# Patient Record
Sex: FEMALE | Race: WHITE | NOT HISPANIC OR LATINO | Employment: OTHER | URBAN - METROPOLITAN AREA
[De-identification: names, ages, dates, MRNs, and addresses within clinical notes are randomized per-mention and may not be internally consistent; named-entity substitution may affect disease eponyms.]

---

## 2017-03-30 ENCOUNTER — GENERIC CONVERSION - ENCOUNTER (OUTPATIENT)
Dept: OTHER | Facility: OTHER | Age: 72
End: 2017-03-30

## 2017-04-09 DIAGNOSIS — Z12.31 ENCOUNTER FOR SCREENING MAMMOGRAM FOR MALIGNANT NEOPLASM OF BREAST: ICD-10-CM

## 2017-04-14 ENCOUNTER — HOSPITAL ENCOUNTER (OUTPATIENT)
Dept: RADIOLOGY | Age: 72
Discharge: HOME | End: 2017-04-14
Payer: MEDICARE

## 2017-04-14 DIAGNOSIS — Z12.31 ENCOUNTER FOR SCREENING MAMMOGRAM FOR MALIGNANT NEOPLASM OF BREAST: ICD-10-CM

## 2017-04-14 PROCEDURE — G0202 SCR MAMMO BI INCL CAD: HCPCS

## 2017-08-01 ENCOUNTER — ALLSCRIPTS OFFICE VISIT (OUTPATIENT)
Dept: OTHER | Facility: OTHER | Age: 72
End: 2017-08-01

## 2017-11-17 ENCOUNTER — ANESTHESIA EVENT (OUTPATIENT)
Dept: GASTROENTEROLOGY | Facility: HOSPITAL | Age: 72
End: 2017-11-17
Payer: MEDICARE

## 2017-11-17 ENCOUNTER — ANESTHESIA (OUTPATIENT)
Dept: GASTROENTEROLOGY | Facility: HOSPITAL | Age: 72
End: 2017-11-17
Payer: MEDICARE

## 2017-11-17 ENCOUNTER — HOSPITAL ENCOUNTER (OUTPATIENT)
Facility: HOSPITAL | Age: 72
Setting detail: OUTPATIENT SURGERY
Discharge: HOME | End: 2017-11-17
Attending: COLON & RECTAL SURGERY | Admitting: COLON & RECTAL SURGERY
Payer: MEDICARE

## 2017-11-17 VITALS
HEIGHT: 62 IN | RESPIRATION RATE: 16 BRPM | BODY MASS INDEX: 26.5 KG/M2 | HEART RATE: 78 BPM | OXYGEN SATURATION: 99 % | WEIGHT: 144 LBS | DIASTOLIC BLOOD PRESSURE: 65 MMHG | TEMPERATURE: 97.9 F | SYSTOLIC BLOOD PRESSURE: 113 MMHG

## 2017-11-17 RX ORDER — LISINOPRIL 20 MG/1
20 TABLET ORAL DAILY
COMMUNITY
End: 2021-07-29 | Stop reason: SDUPTHER

## 2017-11-17 RX ORDER — SODIUM CHLORIDE 9 MG/ML
50 INJECTION, SOLUTION INTRAVENOUS CONTINUOUS
Status: DISCONTINUED | OUTPATIENT
Start: 2017-11-17 | End: 2017-11-17 | Stop reason: HOSPADM

## 2017-11-17 RX ORDER — PROPOFOL 10 MG/ML
INJECTION, EMULSION INTRAVENOUS AS NEEDED
Status: DISCONTINUED | OUTPATIENT
Start: 2017-11-17 | End: 2017-11-17 | Stop reason: SURG

## 2017-11-17 RX ORDER — FOLIC ACID 1 MG/1
800 TABLET ORAL
COMMUNITY

## 2017-11-17 RX ORDER — AMLODIPINE BESYLATE 5 MG/1
5 TABLET ORAL DAILY
COMMUNITY
End: 2021-06-02 | Stop reason: SDUPTHER

## 2017-11-17 RX ORDER — LEVOTHYROXINE SODIUM 0.1 MG/1
88 TABLET ORAL DAILY
COMMUNITY
End: 2021-07-29 | Stop reason: SDUPTHER

## 2017-11-17 RX ORDER — PROPOFOL 10 MG/ML
INJECTION, EMULSION INTRAVENOUS CONTINUOUS PRN
Status: DISCONTINUED | OUTPATIENT
Start: 2017-11-17 | End: 2017-11-17 | Stop reason: SURG

## 2017-11-17 RX ORDER — DIPHENOXYLATE HYDROCHLORIDE AND ATROPINE SULFATE 2.5; .025 MG/1; MG/1
1 TABLET ORAL
COMMUNITY

## 2017-11-17 RX ORDER — LIDOCAINE HYDROCHLORIDE 10 MG/ML
INJECTION, SOLUTION INFILTRATION; PERINEURAL AS NEEDED
Status: DISCONTINUED | OUTPATIENT
Start: 2017-11-17 | End: 2017-11-17 | Stop reason: SURG

## 2017-11-17 RX ORDER — LOVASTATIN 20 MG/1
20 TABLET ORAL
COMMUNITY
End: 2021-10-11 | Stop reason: SDUPTHER

## 2017-11-17 RX ADMIN — SODIUM CHLORIDE 50 ML/HR: 0.9 INJECTION, SOLUTION INTRAVENOUS at 09:19

## 2017-11-17 RX ADMIN — PROPOFOL 80 MG: 10 INJECTION, EMULSION INTRAVENOUS at 09:34

## 2017-11-17 RX ADMIN — LIDOCAINE HYDROCHLORIDE 50 MG: 10 INJECTION, SOLUTION INFILTRATION; PERINEURAL at 09:34

## 2017-11-17 RX ADMIN — PROPOFOL 100 MCG/KG/MIN: 10 INJECTION, EMULSION INTRAVENOUS at 09:34

## 2017-11-17 NOTE — ANESTHESIA POSTPROCEDURE EVALUATION
Post-Op Assessment Note      CV Status:  Stable    Mental Status:  Somnolent    Hydration Status:  Euvolemic    PONV Controlled:  Controlled    Airway Patency:  Patent    Post Op Vitals Reviewed: Yes          Staff: CRNA           BP   93/50   Temp      Pulse  63   Resp 16   SpO2 97

## 2017-11-17 NOTE — OP NOTE
**** GI/ENDOSCOPY REPORT ****     PATIENT NAME: Chris Casas ------ VISIT ID:     INTRODUCTION: Colonoscopy - A 67 female patient presents for an outpatient   Colonoscopy at [address] [place] [address]  PREVIOUS COLONOSCOPY:     INDICATIONS: Rectal bleeding  H/o anal stricture  CONSENT:  The benefits, risks, and alternatives to the procedure were   discussed and informed consent was obtained from the patient  PREPARATION: The patient was identified by myself both verbally and by   visual inspection of ID band  EKG, pulse, pulse oximetry and blood   pressure were monitored throughout the procedure  ASA Classification:   Class 1 - Patient has no organic, physiologic, biochemical, or psychiatric   disturbance  Airway Assessment Classification: Airway class 1 -   Visualization of the soft palate, fauces, uvula, and posterior pillars  MEDICATIONS: Anesthesia-check records     RECTAL EXAM: Normal sphincter tone  Anal canal stritcure anoplasty surgery   well healed with no difficulty  No internal hemorrhoids  No external   hemorrhoids  PROCEDURE:  The endoscope was passed without difficulty through the anus   under direct visualization and advanced to the cecum, confirmed by   photographs  The quality of the preparation was  The scope was withdrawn   and the mucosa was carefully examined  The views were good  The patient's   toleration of the procedure was good  Cecal Intubation Time: 6 minutes(s)   Cecal Withdrawal Time: 7 minutes(s)     FINDINGS:  There was no evidence of erythematous mucosae, polyps, or   tumors in the colon  There was evidence of moderately severe   diverticulosis in the sigmoid colon  Otherwise, the colon appeared to be   normal  Appendiceal opening identified     COMPLICATIONS: There were no complications  IMPRESSIONS: No evidence of erythematous mucosae, polyps, and tumors in   the colon  Moderately severe diverticulosis found in the sigmoid colon  RECOMMENDATIONS: Continue current medications  Call if you are having any   problems: Dr Rosa Maria Lopez [phone]  Start high fiber diet  Colonoscopy   recommended in 5 years  Call if any signs or symptoms of abdominal pain,   bleeding or diverticulitis  PATHOLOGY SPECIMENS:     PROCEDURE CODES: Colonoscopy     ICD-9 Codes: 569 3 Hemorrhage of rectum and anus 562 10 Diverticulosis of   colon (without mention of hemorrhage)     ICD-10 Codes: K62 5 Hemorrhage of anus and rectum K57 Diverticular disease   of intestine     PERFORMED BY: JANET Stratton  on 11/17/2017  Version 1, electronically signed by JANET Stratton  on   11/17/2017 at 09:56

## 2017-11-17 NOTE — ANESTHESIA PREPROCEDURE EVALUATION
Review of Systems/Medical History  Patient summary reviewed  Chart reviewed  No history of anesthetic complications     Cardiovascular  Exercise tolerance: good,  Hypertension controlled, No CAD, , Cardiac stents     Pulmonary  Not a smoker , No asthma: , No shortness of breath, No recent URI , ,        GI/Hepatic            Endo/Other  History of thyroid disease , hypothyroidism,      GYN       Hematology   Musculoskeletal  Rheumatoid arthritis (denies neck pain or instability) Severity: mild,        Neurology   Psychology           Physical Exam    Airway    Mallampati score: II  TM Distance: >3 FB       Dental       Cardiovascular      Pulmonary  Breath sounds clear to auscultation,     Other Findings  NPO > 8 HRS      Anesthesia Plan  ASA Score- 2       Anesthesia Type- IV sedation with anesthesia with ASA Monitors  Additional Monitors:   Airway Plan:     Comment: JANET Briceño , have personally seen and evaluated the patient prior to anesthetic care  I have reviewed the pre-anesthetic record, and other medical records if appropriate to the anesthetic care  If a CRNA is involved in the case, I have reviewed the CRNA assessment, if present, and agree  Risks/benefits and alternatives discussed with patient including possible PONV, sore throat, and possibility of rare anesthetic and surgical emergencies          Induction-       Informed Consent- Anesthetic plan and risks discussed with patient  I personally reviewed this patient with the CRNA  Discussed and agreed on the Anesthesia Plan with the CRNA  Mason Vinson

## 2017-11-17 NOTE — H&P
History and Physical   Colon and Rectal Surgery   Carri Lopez 67 y o  female MRN: [id number]  Unit/Bed#: ZANA NAGY Encounter: [id number]  11/17/17   9:22 AM      No chief complaint on file  History of Present Illness   HPI:  Carri Lopez is a 67 y o  female who presents with h/o rectal bleeding, and anal stricture  Historical Information   Past Medical History:   Diagnosis Date    Disease of thyroid gland     hypothyrodism     Hypertension     Rheumatoid arthritis ([address] )      Past Surgical History:   Procedure Laterality Date    CATARACT EXTRACTION      CERVIX SURGERY      HEMORROIDECTOMY      MENISCECTOMY         Meds/Allergies     Prescriptions Prior to Admission   Medication    amLODIPine (NORVASC) 5 mg tablet    folic acid (FOLVITE) 1 mg tablet    levothyroxine 100 mcg tablet    lisinopril (ZESTRIL) 20 mg tablet    lovastatin (MEVACOR) 20 mg tablet    methotrexate 2 5 mg tablet    multivitamin (THERAGRAN) TABS         Current Facility-Administered Medications:     sodium chloride 0 9 % infusion, 50 mL/hr, Intravenous, Continuous, Eduardo Baez MD, Last Rate: 50 mL/hr at 11/17/17 0919, 50 mL/hr at 11/17/17 0919    Allergies   Allergen Reactions    Penicillins Hives         Social History   History   Alcohol Use    3 0 oz/week    5 Glasses of wine per week     Comment: glass with dinner      History   Drug Use No     History   Smoking Status    Never Smoker   Smokeless Tobacco    Never Used         Family History: History reviewed  No pertinent family history        Objective     Current Vitals:   Blood Pressure: 131/70 (11/17/17 0911)  Pulse: 73 (11/17/17 0911)  Temperature: 97 9 °F (36 6 °C) (11/17/17 0911)  Temp Source: Oral (11/17/17 0911)  Respirations: 18 (11/17/17 0911)  Height: 5' 1 5" (156 2 cm) (11/17/17 0911)  Weight - Scale: 65 3 kg (144 lb) (11/17/17 0911)  SpO2: 97 % (11/17/17 0911)  No intake or output data in the 24 hours ending 11/17/17 1898    Physical Exam:  General: No acute distress  Eyes: Normal   ENT: Normal   Neck: No JVD  Pulm: Normal in A&P  CV: NSR no murmur  Abdomen: Soft and normal on palpation, no mass, no tenderness, no guarding  Rectal: Normal sphincter tone, no perianal skin lesions  Extremities: Normal  Lymphatics: Normal        Lab Results: I have personally reviewed pertinent lab results  Imaging: I have personally reviewed pertinent reports  Patient was consented by myself for procedure as explained earlier with all the risks and benefits described  All questions answered  ASSESSMENT:  Damien Montalvo is a 67 y o  female who presents with  h/o rectal bleeding, and anal stricture  Amber Anderson       PLAN:  colonoscopy

## 2018-01-13 VITALS
SYSTOLIC BLOOD PRESSURE: 138 MMHG | HEIGHT: 62 IN | DIASTOLIC BLOOD PRESSURE: 80 MMHG | BODY MASS INDEX: 26.68 KG/M2 | WEIGHT: 145 LBS

## 2018-01-17 NOTE — MISCELLANEOUS
Message   Recorded as Task   Date: 03/30/2017 03:45 PM, Created By: Giselle Moore   Task Name: Care Coordination   Assigned To: Kathryn Dougherty   Regarding Patient: Festus Tejada, Status: Active   CommentTajkeyanna Lr - 30 Mar 2017 3:45 PM     TASK CREATED  Caller: Self; Care Coordination; [phone] (Home)  pt called - her mammo is deidra for 4/14 - needs an updated mammo rx on file - she's going to [facility] ([place], [place]) - 30 Mar 2017 3:51 PM     TASK EDITED  faxed to [facility]        Active Problems    1  Anal Stricture (569 2)   2  Encounter for routine gynecological examination (V72 31) (Z01 419)   3  Encounter for screening mammogram for malignant neoplasm of breast (V76 12)   (Z12 31)    Current Meds   1  Daily Multiple Vitamins TABS; Therapy: (Recorded:08Jun2015) to Recorded   2  Folic Acid 1 MG Oral Tablet; Therapy: (Recorded:08Jun2015) to Recorded   3  Levothyroxine Sodium 100 MCG Oral Tablet; TAKE 1 TABLET DAILY; Therapy: (Recorded:08Jun2015) to Recorded   4  Lisinopril 20 MG Oral Tablet; TAKE 1 TABLET DAILY; Therapy: (Recorded:08Jun2015) to Recorded   5  Lovastatin TABS; Take 1 tablet twice daily Recorded   6  Methotrexate 2 5 MG Oral Tablet; TAKE 2 TABLETS  WEEKLY Recorded    Allergies    1  Penicillins    Plan  Encounter for screening mammogram for malignant neoplasm of breast    · * MAMMO SCREENING BILATERAL W CAD; Status:Hold For - Scheduling,Retrospective  By Protocol Authorization;  Requested [facility]:[id number];     Signatures   Electronically signed by : Maggie Montgomery, ; Mar 30 2017  3:51PM EST                       (Author)

## 2018-04-14 DIAGNOSIS — Z12.31 ENCOUNTER FOR SCREENING MAMMOGRAM FOR MALIGNANT NEOPLASM OF BREAST: ICD-10-CM

## 2018-04-20 ENCOUNTER — HOSPITAL ENCOUNTER (OUTPATIENT)
Dept: RADIOLOGY | Age: 73
Discharge: HOME | End: 2018-04-20
Payer: MEDICARE

## 2018-04-20 DIAGNOSIS — Z12.31 ENCOUNTER FOR SCREENING MAMMOGRAM FOR MALIGNANT NEOPLASM OF BREAST: ICD-10-CM

## 2018-04-20 PROCEDURE — 77067 SCR MAMMO BI INCL CAD: CPT

## 2019-04-22 ENCOUNTER — TELEPHONE (OUTPATIENT)
Dept: OBGYN CLINIC | Facility: CLINIC | Age: 74
End: 2019-04-22

## 2019-04-22 DIAGNOSIS — Z12.39 BREAST SCREENING, UNSPECIFIED: Primary | ICD-10-CM

## 2019-04-23 ENCOUNTER — HOSPITAL ENCOUNTER (OUTPATIENT)
Dept: RADIOLOGY | Age: 74
Discharge: HOME | End: 2019-04-23
Payer: MEDICARE

## 2019-04-23 VITALS — HEIGHT: 62 IN | BODY MASS INDEX: 25.58 KG/M2 | WEIGHT: 139 LBS

## 2019-04-23 DIAGNOSIS — Z12.39 BREAST SCREENING, UNSPECIFIED: ICD-10-CM

## 2019-04-23 DIAGNOSIS — Z12.31 ENCOUNTER FOR SCREENING MAMMOGRAM FOR MALIGNANT NEOPLASM OF BREAST: ICD-10-CM

## 2019-04-23 PROCEDURE — 77063 BREAST TOMOSYNTHESIS BI: CPT

## 2019-04-23 PROCEDURE — 77067 SCR MAMMO BI INCL CAD: CPT

## 2019-08-05 ENCOUNTER — ANNUAL EXAM (OUTPATIENT)
Dept: OBGYN CLINIC | Facility: CLINIC | Age: 74
End: 2019-08-05
Payer: MEDICARE

## 2019-08-05 VITALS
SYSTOLIC BLOOD PRESSURE: 140 MMHG | WEIGHT: 138.4 LBS | HEIGHT: 60 IN | BODY MASS INDEX: 27.17 KG/M2 | DIASTOLIC BLOOD PRESSURE: 72 MMHG

## 2019-08-05 DIAGNOSIS — Z01.419 ENCOUNTER FOR GYNECOLOGICAL EXAMINATION (GENERAL) (ROUTINE) WITHOUT ABNORMAL FINDINGS: Primary | ICD-10-CM

## 2019-08-05 DIAGNOSIS — R92.2 DENSE BREAST: ICD-10-CM

## 2019-08-05 DIAGNOSIS — Z13.820 SCREENING FOR OSTEOPOROSIS: ICD-10-CM

## 2019-08-05 DIAGNOSIS — Z12.39 SCREENING FOR MALIGNANT NEOPLASM OF BREAST: ICD-10-CM

## 2019-08-05 PROBLEM — R92.30 DENSE BREAST: Status: ACTIVE | Noted: 2019-08-05

## 2019-08-05 PROCEDURE — G0101 CA SCREEN;PELVIC/BREAST EXAM: HCPCS | Performed by: OBSTETRICS & GYNECOLOGY

## 2019-08-05 NOTE — PROGRESS NOTES
Assessment/Plan:    No problem-specific Assessment & Plan notes found for this encounter  Diagnoses and all orders for this visit:    Screening for osteoporosis    Encounter for gynecological examination (general) (routine) without abnormal findings    Screening for malignant neoplasm of breast  -     Mammo screening bilateral w 3d & cad; Future    Dense breast  -     Mammo screening bilateral w 3d & cad; Future    Other orders  -     Cancel: DXA bone density spine hip and pelvis; Future        Annual examination was completed  Mammogram was ordered  Patient to return to the office in 2 years per Medicare guidelines  Subjective:      Patient ID: Jennifer Donnelly is a 68 y o  female  Patient returns for annual gyn visit  She has no new medical surgical issues  She denies postmenopausal bleeding  She is up-to-date with DEXA as well as colonoscopy  She has no bowel or bladder issues  The following portions of the patient's history were reviewed and updated as appropriate:   She  has a past medical history of Disease of thyroid gland, Hypertension, and Rheumatoid arthritis ([facility] [address] )  She   Patient Active Problem List    Diagnosis Date Noted    Encounter for gynecological examination (general) (routine) without abnormal findings 08/05/2019    Screening for malignant neoplasm of breast 08/05/2019    Dense breast 08/05/2019    Screening for osteoporosis 08/05/2019     She  has a past surgical history that includes Cervix surgery; Cataract extraction; Hemorroidectomy; Meniscectomy; and pr colonoscopy flx dx w/collj spec when pfrmd (N/A, 11/17/2017)  Her family history includes Breast cancer (age of onset: 47) in her mother; Breast cancer (age of onset: 61) in her maternal aunt; Endometrial cancer (age of onset: 62) in her cousin; Prostate cancer (age of onset: 71) in her brother; Prostate cancer (age of onset: [de-identified]) in her paternal uncle  She  reports that she has never smoked   She has never used smokeless tobacco  She reports that she drinks about 5 0 standard drinks of alcohol per week  She reports that she does not use drugs  Current Outpatient Medications   Medication Sig Dispense Refill    amLODIPine (NORVASC) 5 mg tablet Take 5 mg by mouth daily      folic acid (FOLVITE) 1 mg tablet Take by mouth daily      levothyroxine 100 mcg tablet Take 88 mcg by mouth daily       lisinopril (ZESTRIL) 20 mg tablet Take 20 mg by mouth daily      lovastatin (MEVACOR) 20 mg tablet Take 20 mg by mouth daily at bedtime      methotrexate 2 5 mg tablet Take by mouth once a week      multivitamin (THERAGRAN) TABS Take 1 tablet by mouth daily       No current facility-administered medications for this visit  Current Outpatient Medications on File Prior to Visit   Medication Sig    amLODIPine (NORVASC) 5 mg tablet Take 5 mg by mouth daily    folic acid (FOLVITE) 1 mg tablet Take by mouth daily    levothyroxine 100 mcg tablet Take 88 mcg by mouth daily     lisinopril (ZESTRIL) 20 mg tablet Take 20 mg by mouth daily    lovastatin (MEVACOR) 20 mg tablet Take 20 mg by mouth daily at bedtime    methotrexate 2 5 mg tablet Take by mouth once a week    multivitamin (THERAGRAN) TABS Take 1 tablet by mouth daily     No current facility-administered medications on file prior to visit  She is allergic to penicillins       Review of Systems   All other systems reviewed and are negative  Objective:      /72 (BP Location: Left arm, Patient Position: Sitting, Cuff Size: Standard)   Ht 5' (1 524 m)   Wt 62 8 kg (138 lb 6 4 oz)   BMI 27 03 kg/m²          Physical Exam   Constitutional: She is oriented to person, place, and time  She appears well-developed and well-nourished  HENT:   Head: Normocephalic and atraumatic  Nose: Nose normal    Eyes: Pupils are equal, round, and reactive to light  EOM are normal    Neck: Normal range of motion  Neck supple  No thyromegaly present     Pulmonary/Chest: Effort normal  No respiratory distress  No breast tenderness, discharge or bleeding  Breasts symmetrical without masses, skin changes or adenopathy   Abdominal: Soft  She exhibits no distension and no mass  There is no tenderness  Hernia confirmed negative in the right inguinal area and confirmed negative in the left inguinal area  Genitourinary: Vagina normal and uterus normal  No breast tenderness, discharge or bleeding  Pelvic exam was performed with patient supine  No labial fusion  There is no rash, tenderness, lesion or injury on the right labia  There is no rash, tenderness, lesion or injury on the left labia  Cervix exhibits no motion tenderness, no discharge and no friability  Genitourinary Comments: Ext gen nl w/o lesions  Vag atrophic w/o lesions or discharge  Cervix healthy w/o lesions  Uterus nl size, NT  Adnexa NT no masses  Rectal no masses   Musculoskeletal: Normal range of motion  She exhibits no edema  Lymphadenopathy:        Right: No inguinal adenopathy present  Left: No inguinal adenopathy present  Neurological: She is alert and oriented to person, place, and time  She has normal reflexes  Skin: Skin is warm and dry  No rash noted  Psychiatric: She has a normal mood and affect  Her behavior is normal  Thought content normal    Nursing note and vitals reviewed

## 2020-01-14 ENCOUNTER — TELEPHONE (OUTPATIENT)
Dept: OBGYN CLINIC | Facility: CLINIC | Age: 75
End: 2020-01-14

## 2020-01-14 DIAGNOSIS — Z12.31 VISIT FOR SCREENING MAMMOGRAM: Primary | ICD-10-CM

## 2020-01-14 NOTE — TELEPHONE ENCOUNTER
Patient needs a new script for her mammogram because thje one she had was signed by Dr Marita Brian

## 2020-08-27 ENCOUNTER — HOSPITAL ENCOUNTER (OUTPATIENT)
Dept: RADIOLOGY | Age: 75
Discharge: HOME | End: 2020-08-27
Payer: MEDICARE

## 2020-08-27 VITALS — BODY MASS INDEX: 25.91 KG/M2 | WEIGHT: 132 LBS | HEIGHT: 60 IN

## 2020-08-27 DIAGNOSIS — Z12.31 VISIT FOR SCREENING MAMMOGRAM: ICD-10-CM

## 2020-08-27 PROCEDURE — 77067 SCR MAMMO BI INCL CAD: CPT

## 2020-08-27 PROCEDURE — 77063 BREAST TOMOSYNTHESIS BI: CPT

## 2021-01-20 DIAGNOSIS — Z23 ENCOUNTER FOR IMMUNIZATION: ICD-10-CM

## 2021-01-23 ENCOUNTER — IMMUNIZATIONS (OUTPATIENT)
Dept: FAMILY MEDICINE CLINIC | Facility: HOSPITAL | Age: 76
End: 2021-01-23

## 2021-01-23 DIAGNOSIS — Z23 ENCOUNTER FOR IMMUNIZATION: Primary | ICD-10-CM

## 2021-01-23 PROCEDURE — 91301 SARS-COV-2 / COVID-19 MRNA VACCINE (MODERNA) 100 MCG: CPT

## 2021-01-23 PROCEDURE — 0011A SARS-COV-2 / COVID-19 MRNA VACCINE (MODERNA) 100 MCG: CPT

## 2021-02-22 ENCOUNTER — IMMUNIZATIONS (OUTPATIENT)
Dept: FAMILY MEDICINE CLINIC | Facility: HOSPITAL | Age: 76
End: 2021-02-22

## 2021-02-22 DIAGNOSIS — Z23 ENCOUNTER FOR IMMUNIZATION: Primary | ICD-10-CM

## 2021-02-22 PROCEDURE — 91301 SARS-COV-2 / COVID-19 MRNA VACCINE (MODERNA) 100 MCG: CPT

## 2021-02-22 PROCEDURE — 0012A SARS-COV-2 / COVID-19 MRNA VACCINE (MODERNA) 100 MCG: CPT

## 2021-06-02 DIAGNOSIS — I10 ESSENTIAL HYPERTENSION: Primary | ICD-10-CM

## 2021-06-03 RX ORDER — AMLODIPINE BESYLATE 5 MG/1
5 TABLET ORAL DAILY
Qty: 90 TABLET | Refills: 1 | Status: SHIPPED | OUTPATIENT
Start: 2021-06-03 | End: 2021-11-29 | Stop reason: SDUPTHER

## 2021-07-29 DIAGNOSIS — E03.9 HYPOTHYROIDISM, UNSPECIFIED TYPE: Primary | ICD-10-CM

## 2021-07-29 DIAGNOSIS — I10 ESSENTIAL HYPERTENSION: ICD-10-CM

## 2021-07-29 RX ORDER — LEVOTHYROXINE SODIUM 88 UG/1
88 TABLET ORAL DAILY
COMMUNITY
Start: 2021-05-01 | End: 2021-10-18 | Stop reason: SDUPTHER

## 2021-07-29 RX ORDER — ALENDRONATE SODIUM 70 MG/1
70 TABLET ORAL
COMMUNITY
Start: 2021-05-03 | End: 2022-01-12 | Stop reason: SDUPTHER

## 2021-07-30 RX ORDER — LEVOTHYROXINE SODIUM 88 UG/1
88 TABLET ORAL DAILY
Qty: 90 TABLET | Refills: 1 | Status: SHIPPED | OUTPATIENT
Start: 2021-07-30 | End: 2022-01-31 | Stop reason: SDUPTHER

## 2021-07-30 RX ORDER — LISINOPRIL 20 MG/1
20 TABLET ORAL DAILY
Qty: 90 TABLET | Refills: 1 | Status: SHIPPED | OUTPATIENT
Start: 2021-07-30 | End: 2022-02-10 | Stop reason: SDUPTHER

## 2021-08-25 ENCOUNTER — ANNUAL EXAM (OUTPATIENT)
Dept: OBGYN CLINIC | Facility: CLINIC | Age: 76
End: 2021-08-25
Payer: MEDICARE

## 2021-08-25 VITALS
SYSTOLIC BLOOD PRESSURE: 132 MMHG | BODY MASS INDEX: 25.72 KG/M2 | WEIGHT: 131 LBS | DIASTOLIC BLOOD PRESSURE: 84 MMHG | HEIGHT: 60 IN

## 2021-08-25 DIAGNOSIS — Z78.0 ASYMPTOMATIC POSTMENOPAUSAL ESTROGEN DEFICIENCY: ICD-10-CM

## 2021-08-25 DIAGNOSIS — Z01.419 ENCOUNTER FOR GYNECOLOGICAL EXAMINATION (GENERAL) (ROUTINE) WITHOUT ABNORMAL FINDINGS: Primary | ICD-10-CM

## 2021-08-25 PROCEDURE — G0101 CA SCREEN;PELVIC/BREAST EXAM: HCPCS | Performed by: OBSTETRICS & GYNECOLOGY

## 2021-08-26 NOTE — PROGRESS NOTES
Assessment/Plan:     Diagnoses and all orders for this visit:    Encounter for gynecological examination (general) (routine) without abnormal findings    Asymptomatic postmenopausal estrogen deficiency  -     DXA bone density spine hip and pelvis; Future             Subjective      Carri Lopez is a 76 y o  female who presents for annual exam  The patient has no complaints today  The patient is not sexually active  GYN screening history: last mammogram: approximate date 2020 and was normal  The patient is not taking hormone replacement therapy  Patient denies post-menopausal vaginal bleeding  She denies any urinary concerns  Menstrual History:  OB History        2    Para   2    Term   2            AB        Living           SAB        TAB        Ectopic        Multiple        Live Births                    No LMP recorded  Patient is postmenopausal      Past Medical History:   Diagnosis Date    Cataract     Disease of thyroid gland     hypothyrodism     Hypertension     Lumbago     Pure hypercholesterolemia     Rheumatoid arthritis ([address] )     Stenosis of rectum and anus        Family History   Problem Relation Age of Onset    Breast cancer Mother 47    No Known Problems Father     Prostate cancer Brother 71    Breast cancer Maternal Aunt 61    Prostate cancer Paternal Uncle [de-identified]    Endometrial cancer Cousin 62    No Known Problems Daughter     No Known Problems Maternal Grandmother     No Known Problems Maternal Grandfather     No Known Problems Paternal Grandmother     No Known Problems Paternal Grandfather     No Known Problems Daughter        The following portions of the patient's history were reviewed and updated as appropriate: allergies, current medications, past family history, past medical history, past social history, past surgical history and problem list     Review of Systems  Pertinent items are noted in HPI       Objective      /84   Ht 4' 11 75" (1 518 m) Wt 59 4 kg (131 lb)   Breastfeeding No   BMI 25 80 kg/m²     General:   alert and oriented, in no acute distress   Heart:    Breasts: regular rate and rhythm, S1, S2 normal, no murmur, click, rub or gallop   appear normal, no suspicious masses, no skin or nipple changes or axillary nodes     Lungs: clear to auscultation bilaterally   Abdomen: soft, non-tender, without masses or organomegaly   Vulva: normal   Vagina: normal mucosa   Cervix: no lesions   Uterus: normal size, mobile, non-tender   Adnexa: normal adnexa and no mass, fullness, tenderness

## 2021-09-03 ENCOUNTER — HOSPITAL ENCOUNTER (OUTPATIENT)
Dept: RADIOLOGY | Age: 76
Discharge: HOME | End: 2021-09-03
Payer: MEDICARE

## 2021-09-03 VITALS — HEIGHT: 60 IN | BODY MASS INDEX: 27.48 KG/M2 | WEIGHT: 140 LBS

## 2021-09-03 DIAGNOSIS — Z12.31 ENCOUNTER FOR SCREENING MAMMOGRAM FOR MALIGNANT NEOPLASM OF BREAST: ICD-10-CM

## 2021-09-03 PROCEDURE — 77067 SCR MAMMO BI INCL CAD: CPT

## 2021-09-03 PROCEDURE — 77063 BREAST TOMOSYNTHESIS BI: CPT

## 2021-10-11 DIAGNOSIS — E78.2 MIXED HYPERLIPIDEMIA: Primary | ICD-10-CM

## 2021-10-11 RX ORDER — LOVASTATIN 20 MG/1
20 TABLET ORAL
Qty: 90 TABLET | Refills: 1 | Status: SHIPPED | OUTPATIENT
Start: 2021-10-11 | End: 2022-04-14 | Stop reason: SDUPTHER

## 2021-10-18 ENCOUNTER — CONSULT (OUTPATIENT)
Dept: INTERNAL MEDICINE CLINIC | Facility: CLINIC | Age: 76
End: 2021-10-18
Payer: MEDICARE

## 2021-10-18 VITALS
WEIGHT: 139.2 LBS | HEIGHT: 61 IN | OXYGEN SATURATION: 98 % | BODY MASS INDEX: 26.28 KG/M2 | DIASTOLIC BLOOD PRESSURE: 70 MMHG | SYSTOLIC BLOOD PRESSURE: 120 MMHG | HEART RATE: 78 BPM | TEMPERATURE: 70 F

## 2021-10-18 DIAGNOSIS — E78.2 MIXED HYPERLIPIDEMIA: ICD-10-CM

## 2021-10-18 DIAGNOSIS — Z23 NEED FOR INFLUENZA VACCINATION: ICD-10-CM

## 2021-10-18 DIAGNOSIS — E03.4 HYPOTHYROIDISM DUE TO ACQUIRED ATROPHY OF THYROID: Primary | ICD-10-CM

## 2021-10-18 DIAGNOSIS — I15.9 SECONDARY HYPERTENSION: ICD-10-CM

## 2021-10-18 DIAGNOSIS — Z01.818 PREOPERATIVE CLEARANCE: ICD-10-CM

## 2021-10-18 DIAGNOSIS — H43.811 POSTERIOR VITREOUS DETACHMENT OF RIGHT EYE: ICD-10-CM

## 2021-10-18 DIAGNOSIS — M06.09 RHEUMATOID ARTHRITIS OF MULTIPLE SITES WITH NEGATIVE RHEUMATOID FACTOR (HCC): ICD-10-CM

## 2021-10-18 PROCEDURE — 99214 OFFICE O/P EST MOD 30 MIN: CPT | Performed by: NURSE PRACTITIONER

## 2021-10-18 PROCEDURE — 90662 IIV NO PRSV INCREASED AG IM: CPT

## 2021-10-18 PROCEDURE — G0008 ADMIN INFLUENZA VIRUS VAC: HCPCS

## 2021-10-19 PROCEDURE — 93000 ELECTROCARDIOGRAM COMPLETE: CPT | Performed by: NURSE PRACTITIONER

## 2021-11-29 DIAGNOSIS — I10 ESSENTIAL HYPERTENSION: ICD-10-CM

## 2021-11-29 RX ORDER — AMLODIPINE BESYLATE 5 MG/1
5 TABLET ORAL DAILY
Qty: 90 TABLET | Refills: 1 | Status: SHIPPED | OUTPATIENT
Start: 2021-11-29 | End: 2022-06-01 | Stop reason: SDUPTHER

## 2022-01-12 DIAGNOSIS — Z13.820 SCREENING FOR OSTEOPOROSIS: Primary | ICD-10-CM

## 2022-01-12 RX ORDER — ALENDRONATE SODIUM 70 MG/1
70 TABLET ORAL
Qty: 12 TABLET | Refills: 1 | Status: SHIPPED | OUTPATIENT
Start: 2022-01-12 | End: 2022-07-05 | Stop reason: SDUPTHER

## 2022-01-31 DIAGNOSIS — E03.9 HYPOTHYROIDISM, UNSPECIFIED TYPE: ICD-10-CM

## 2022-01-31 RX ORDER — LEVOTHYROXINE SODIUM 88 UG/1
88 TABLET ORAL DAILY
Qty: 90 TABLET | Refills: 1 | Status: SHIPPED | OUTPATIENT
Start: 2022-01-31 | End: 2022-08-03 | Stop reason: SDUPTHER

## 2022-02-10 DIAGNOSIS — I10 ESSENTIAL HYPERTENSION: ICD-10-CM

## 2022-02-10 RX ORDER — LISINOPRIL 20 MG/1
20 TABLET ORAL DAILY
Qty: 90 TABLET | Refills: 1 | Status: SHIPPED | OUTPATIENT
Start: 2022-02-10

## 2022-04-14 DIAGNOSIS — E78.2 MIXED HYPERLIPIDEMIA: ICD-10-CM

## 2022-04-14 RX ORDER — LOVASTATIN 20 MG/1
20 TABLET ORAL
Qty: 90 TABLET | Refills: 1 | Status: SHIPPED | OUTPATIENT
Start: 2022-04-14

## 2022-04-29 LAB
ALBUMIN SERPL-MCNC: 4.2 G/DL (ref 3.6–5.1)
ALBUMIN/GLOB SERPL: 1.7 (CALC) (ref 1–2.5)
ALP SERPL-CCNC: 66 U/L (ref 37–153)
ALT SERPL-CCNC: 11 U/L (ref 6–29)
AST SERPL-CCNC: 20 U/L (ref 10–35)
BASOPHILS # BLD AUTO: 60 CELLS/UL (ref 0–200)
BASOPHILS NFR BLD AUTO: 1 %
BILIRUB SERPL-MCNC: 0.6 MG/DL (ref 0.2–1.2)
BUN SERPL-MCNC: 12 MG/DL (ref 7–25)
BUN/CREAT SERPL: NORMAL (CALC) (ref 6–22)
CALCIUM SERPL-MCNC: 9.1 MG/DL (ref 8.6–10.4)
CHLORIDE SERPL-SCNC: 99 MMOL/L (ref 98–110)
CO2 SERPL-SCNC: 30 MMOL/L (ref 20–32)
CREAT SERPL-MCNC: 0.71 MG/DL (ref 0.6–0.93)
EOSINOPHIL # BLD AUTO: 138 CELLS/UL (ref 15–500)
EOSINOPHIL NFR BLD AUTO: 2.3 %
ERYTHROCYTE [DISTWIDTH] IN BLOOD BY AUTOMATED COUNT: 14.9 % (ref 11–15)
GLOBULIN SER CALC-MCNC: 2.5 G/DL (CALC) (ref 1.9–3.7)
GLUCOSE SERPL-MCNC: 82 MG/DL (ref 65–99)
HCT VFR BLD AUTO: 41.3 % (ref 35–45)
HGB BLD-MCNC: 13.5 G/DL (ref 11.7–15.5)
LDLC SERPL DIRECT ASSAY-MCNC: 116 MG/DL
LYMPHOCYTES # BLD AUTO: 1008 CELLS/UL (ref 850–3900)
LYMPHOCYTES NFR BLD AUTO: 16.8 %
MCH RBC QN AUTO: 28.7 PG (ref 27–33)
MCHC RBC AUTO-ENTMCNC: 32.7 G/DL (ref 32–36)
MCV RBC AUTO: 87.9 FL (ref 80–100)
MONOCYTES # BLD AUTO: 672 CELLS/UL (ref 200–950)
MONOCYTES NFR BLD AUTO: 11.2 %
NEUTROPHILS # BLD AUTO: 4122 CELLS/UL (ref 1500–7800)
NEUTROPHILS NFR BLD AUTO: 68.7 %
PLATELET # BLD AUTO: 272 THOUSAND/UL (ref 140–400)
PMV BLD REES-ECKER: 9.1 FL (ref 7.5–12.5)
POTASSIUM SERPL-SCNC: 4 MMOL/L (ref 3.5–5.3)
PROT SERPL-MCNC: 6.7 G/DL (ref 6.1–8.1)
RBC # BLD AUTO: 4.7 MILLION/UL (ref 3.8–5.1)
SL AMB EGFR AFRICAN AMERICAN: 96 ML/MIN/1.73M2
SL AMB EGFR NON AFRICAN AMERICAN: 83 ML/MIN/1.73M2
SODIUM SERPL-SCNC: 136 MMOL/L (ref 135–146)
TSH SERPL-ACNC: 2.83 MIU/L (ref 0.4–4.5)
WBC # BLD AUTO: 6 THOUSAND/UL (ref 3.8–10.8)

## 2022-05-10 ENCOUNTER — OFFICE VISIT (OUTPATIENT)
Dept: INTERNAL MEDICINE CLINIC | Facility: CLINIC | Age: 77
End: 2022-05-10
Payer: MEDICARE

## 2022-05-10 VITALS
WEIGHT: 141 LBS | DIASTOLIC BLOOD PRESSURE: 58 MMHG | SYSTOLIC BLOOD PRESSURE: 120 MMHG | OXYGEN SATURATION: 98 % | HEIGHT: 61 IN | BODY MASS INDEX: 26.62 KG/M2 | TEMPERATURE: 97.7 F | HEART RATE: 74 BPM

## 2022-05-10 DIAGNOSIS — M06.09 RHEUMATOID ARTHRITIS OF MULTIPLE SITES WITH NEGATIVE RHEUMATOID FACTOR (HCC): ICD-10-CM

## 2022-05-10 DIAGNOSIS — E03.4 HYPOTHYROIDISM DUE TO ACQUIRED ATROPHY OF THYROID: Primary | ICD-10-CM

## 2022-05-10 DIAGNOSIS — E78.2 MIXED HYPERLIPIDEMIA: ICD-10-CM

## 2022-05-10 PROBLEM — R04.0 EPISTAXIS: Status: ACTIVE | Noted: 2022-05-10

## 2022-05-10 PROCEDURE — 1123F ACP DISCUSS/DSCN MKR DOCD: CPT | Performed by: INTERNAL MEDICINE

## 2022-05-10 PROCEDURE — G0438 PPPS, INITIAL VISIT: HCPCS | Performed by: INTERNAL MEDICINE

## 2022-05-10 PROCEDURE — 99214 OFFICE O/P EST MOD 30 MIN: CPT | Performed by: INTERNAL MEDICINE

## 2022-05-10 RX ORDER — CHOLECALCIFEROL (VITAMIN D3) 25 MCG
CAPSULE ORAL DAILY
COMMUNITY

## 2022-05-10 RX ORDER — DIFLUPREDNATE 0.5 MG/ML
EMULSION OPHTHALMIC 4 TIMES DAILY
COMMUNITY
Start: 2022-04-19

## 2022-05-10 RX ORDER — BROMFENAC SODIUM 0.7 MG/ML
SOLUTION/ DROPS OPHTHALMIC DAILY
COMMUNITY
Start: 2022-04-20

## 2022-05-10 NOTE — PROGRESS NOTES
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 factor ([address] )       Other    Mixed hyperlipidemia     Early elevated LDL cholesterol  Discussed with patient regarding change of medication  Patient would like to have about 6 months to stay modify her diet to see if this can be adjusted with a change in her diet  Have given her script for repeat lipid panel prior to next visit  Also recommend about 20 minutes of walking 5 times a week for cardiovascular fitness and to help with reducing cholesterol level  Relevant Orders    Lipid Panel with Direct LDL reflex        BMI Counseling: Body mass index is 26 62 kg/m²  The BMI is above normal  Nutrition recommendations include decreasing portion sizes  Exercise recommendations include moderate physical activity 150 minutes/week and exercising 3-5 times per week  No pharmacotherapy was ordered  Rationale for BMI follow-up plan is due to patient being overweight or obese  Depression Screening and Follow-up Plan: Patient was screened for depression during today's encounter  They screened negative with a PHQ-2 score of 1  Preventive health issues were discussed with patient, and age appropriate screening tests were ordered as noted in patient's After Visit Summary  Personalized health advice and appropriate referrals for health education or preventive services given if needed, as noted in patient's After Visit Summary       History of Present Illness:     Patient presents for Medicare Annual Wellness visit    Patient Care Team:  Dennie Pang, MD as Endoscopist     Problem List:     Patient Active Problem List   Diagnosis    Encounter for gynecological examination (general) (routine) without abnormal findings    Screening for malignant neoplasm of breast    Dense breast    Screening for osteoporosis    Secondary hypertension    Hypothyroidism due to acquired atrophy of thyroid    Mixed hyperlipidemia    Rheumatoid arthritis of multiple sites with negative rheumatoid factor [facility] )    Preoperative clearance    Epistaxis      Past Medical and Surgical History:     Past Medical History:   Diagnosis Date    Cataract     Disease of thyroid gland     hypothyrodism     Hypertension     Lumbago     Pure hypercholesterolemia     [facility]     Stenosis of rectum and anus      Past Surgical History:   Procedure Laterality Date    CATARACT EXTRACTION Bilateral     CERVIX SURGERY      HEMORROIDECTOMY      [facility]HISTOR[facility])  2020    [facility]    MENISCECTOMY      HI COLONOSCOPY FLX DX W/COLLJ SPEC WHEN PFRMD N/A 11/17/2017    Procedure: COLONOSCOPY;  Surgeon: Ovi Chambers MD;  Location:  GI LAB;   Service: Colorectal      Family History:     Family History   Problem Relation Age of Onset    Breast cancer Mother 47    No Known Problems Father     Prostate cancer Brother 71    Breast cancer Maternal Aunt 61    Prostate cancer Paternal Uncle [de-identified]    Endometrial cancer Cousin 62    No Known Problems Daughter     No Known Problems Maternal Grandmother     No Known Problems Maternal Grandfather     No Known Problems Paternal Grandmother     No Known Problems Paternal Grandfather     No Known Problems Daughter       Social History:     Social History     Socioeconomic History    Marital status: /Civil Union     Spouse name: None    Number of children: None    Years of education: None    Highest education level: None   Occupational History    None   Tobacco Use    Smoking status: Never Smoker    Smokeless tobacco: Never Used   Vaping Use    Vaping Use: Never used   Substance and Sexual Activity    Alcohol use: Yes     Comment: 1 drink new yeasrs oren    Drug use: No    Sexual activity: Not Currently   Other Topics Concern    None   Social History Narrative    None     Social Determinants of Health     Financial Resource Strain: Not on file   Food Insecurity: Not on file   Transportation Needs: Not on file   Physical Activity: Not on file   Stress: Not on file   Social Connections: Not on file   Intimate Partner Violence: Not on file   Housing Stability: Not on file      Medications and Allergies:     Current Outpatient Medications   Medication Sig Dispense Refill    alendronate (FOSAMAX) 70 mg tablet Take 1 tablet (70 mg total) by mouth weekly before breakfast 12 tablet 1    amLODIPine (NORVASC) 5 mg tablet Take 1 tablet (5 mg total) by mouth daily 90 tablet 1    Cholecalciferol (Vitamin D-3) 25 MCG (1000 UT) CAPS Take by mouth in the morning      Difluprednate 0 05 % EMUL 4 (four) times a day Rt eye  4 times per day      folic acid (FOLVITE) 1 mg tablet Take 800 mcg by mouth 4 (four) times a week       levothyroxine 88 mcg tablet Take 1 tablet (88 mcg total) by mouth daily 90 tablet 1    lisinopril (ZESTRIL) 20 mg tablet Take 1 tablet (20 mg total) by mouth daily 90 tablet 1    lovastatin (MEVACOR) 20 mg tablet Take 1 tablet (20 mg total) by mouth daily at bedtime 90 tablet 1    methotrexate 2 5 mg tablet Take 15 mg by mouth once a week       Multiple Minerals-Vitamins (CITRACAL PLUS PO) Take by mouth 200 mg daily      multivitamin (THERAGRAN) TABS Take 1 tablet by mouth 4 (four) times a week       Prolensa 0 07 % SOLN Use in the morning       No current facility-administered medications for this visit       Allergies   Allergen Reactions    Penicillins Hives      Immunizations:     Immunization History   Administered Date(s) Administered    COVID-19 MODERNA VACC 0 5 ML IM 01/23/2021, 01/23/2021, 02/22/2021, 02/22/2021    INFLUENZA 10/29/2018, 10/29/2018, 10/23/2019, 10/23/2019, 08/25/2020, 08/25/2020, 10/18/2021    Influenza, high dose seasonal 0 7 mL 10/18/2021    MMR 10/17/2017, 10/17/2017    Pneumococcal Conjugate 13-Valent 03/30/2016, 03/30/2016    Pneumococcal Polysaccharide PPV23 12/09/2013, 12/09/2013    Tdap 10/17/2016, 10/17/2016    Zoster 07/16/2015, 07/16/2015    Zoster Vaccine Recombinant 02/05/2019, 06/09/2019      Health Maintenance:         Topic Date Due    Hepatitis C Screening  Never done    Breast Cancer Screening: Mammogram  09/03/2022    Colorectal Cancer Screening  11/17/2022         Topic Date Due    DTaP,Tdap,and Td Vaccines (1 - Tdap) 10/17/2016    Pneumococcal Vaccine: 65+ Years (3 - PPSV23 or PCV20) 12/09/2018    COVID-19 Vaccine (3 - Moderna risk series) 03/22/2021      Medicare Health Risk Assessment:     /58 (BP Location: Left arm, Patient Position: Sitting, Cuff Size: Standard)   Pulse 74   Temp 97 7 °F (36 5 °C) (Tympanic)   Ht 5' 1 02" (1 55 m)   Wt 64 kg (141 lb)   SpO2 98%   BMI 26 62 kg/m²      Nubia Terry is here for her Subsequent Wellness visit  Last Medicare Wellness visit information reviewed, patient interviewed and updates made to the record today  Health Risk Assessment:   Patient rates overall health as good  Patient feels that their physical health rating is same  Patient is satisfied with their life  Eyesight was rated as slightly worse  Hearing was rated as same  Patient feels that their emotional and mental health rating is same  Patients states they are never, rarely angry  Patient states they are sometimes unusually tired/fatigued  Pain experienced in the last 7 days has been some  Patient's pain rating has been 1/10  Depression Screening:   PHQ-2 Score: 1      Fall Risk Screening: In the past year, patient has experienced: no history of falling in past year      Urinary Incontinence Screening:   Patient has not leaked urine accidently in the last six months  Home Safety:  Patient does not have trouble with stairs inside or outside of their home   Patient has working smoke alarms and has no working carbon monoxide detector  Home safety hazards include: none  Nutrition:   Current diet is Limited junk food and Regular  Medications:   Patient is able to manage medications  Activities of Daily Living (ADLs)/Instrumental Activities of Daily Living (IADLs):   Walk and transfer into and out of bed and chair?: Yes  Dress and groom yourself?: Yes    Bathe or shower yourself?: Yes    Feed yourself?  Yes  Do your laundry/housekeeping?: Yes  Manage your money, pay your bills and track your expenses?: Yes  Make your own meals?: Yes    Do your own shopping?: Yes    Previous Hospitalizations:   Any hospitalizations or ED visits within the last 12 months?: Yes    How many hospitalizations have you had in the last year?: 1-2    Advance Care Planning:   Living will: No      PREVENTIVE SCREENINGS      Cardiovascular Screening:    General: Screening Not Indicated and History Lipid Disorder      Diabetes Screening:     General: Screening Current      Colorectal Cancer Screening:     General: Screening Current      Breast Cancer Screening:     General: Screening Current      Cervical Cancer Screening:    General: Screening Not Indicated      Osteoporosis Screening:    General: Risks and Benefits Discussed      Lung Cancer Screening:     General: Screening Not Indicated    Screening, Brief Intervention, and Referral to Treatment (SBIRT)    Screening      Single Item Drug Screening:  How often have you used an illegal drug (including marijuana) or a prescription medication for non-medical reasons in the past year? never    Single Item Drug Screen Score: 0  Interpretation: Negative screen for possible drug use disorder      Lucia Blanco MD

## 2022-05-10 NOTE — ASSESSMENT & PLAN NOTE
Early elevated LDL cholesterol  Discussed with patient regarding change of medication  Patient would like to have about 6 months to stay modify her diet to see if this can be adjusted with a change in her diet  Have given her script for repeat lipid panel prior to next visit  Also recommend about 20 minutes of walking 5 times a week for cardiovascular fitness and to help with reducing cholesterol level

## 2022-05-10 NOTE — PATIENT INSTRUCTIONS
Medicare Preventive Visit Patient Instructions  Thank you for completing your Welcome to Medicare Visit or Medicare Annual Wellness Visit today  Your next wellness visit will be due in one year (5/11/2023)  The screening/preventive services that you may require over the next 5-10 years are detailed below  Some tests may not apply to you based off risk factors and/or age  Screening tests ordered at today's visit but not completed yet may show as past due  Also, please note that scanned in results may not display below  Preventive Screenings:  Service Recommendations Previous Testing/Comments   Colorectal Cancer Screening  * Colonoscopy    * Fecal Occult Blood Test (FOBT)/Fecal Immunochemical Test (FIT)  * Fecal DNA/Cologuard Test  * Flexible Sigmoidoscopy Age: 54-65 years old   Colonoscopy: every 10 years (may be performed more frequently if at higher risk)  OR  FOBT/FIT: every 1 year  OR  Cologuard: every 3 years  OR  Sigmoidoscopy: every 5 years  Screening may be recommended earlier than age 48 if at higher risk for colorectal cancer  Also, an individualized decision between you and your healthcare provider will decide whether screening between the ages of 74-80 would be appropriate  Colonoscopy: 11/17/2017  FOBT/FIT: Not on file  Cologuard: Not on file  Sigmoidoscopy: Not on file    Screening Current     Breast Cancer Screening Age: 36 years old  Frequency: every 1-2 years  Not required if history of left and right mastectomy Mammogram: 09/03/2021    Screening Current   Cervical Cancer Screening Between the ages of 21-29, pap smear recommended once every 3 years  Between the ages of 33-67, can perform pap smear with HPV co-testing every 5 years     Recommendations may differ for women with a history of total hysterectomy, cervical cancer, or abnormal pap smears in past  Pap Smear: 08/25/2021    Screening Not Indicated   Hepatitis C Screening Once for adults born between 1945 and 1965  More frequently in patients at high risk for Hepatitis C Hep C Antibody: Not on file        Diabetes Screening 1-2 times per year if you're at risk for diabetes or have pre-diabetes Fasting glucose: No results in last 5 years   A1C: No results in last 5 years    Screening Current   Cholesterol Screening Once every 5 years if you don't have a lipid disorder  May order more often based on risk factors  Lipid panel: Not on file    Screening Not Indicated  History Lipid Disorder     Other Preventive Screenings Covered by Medicare:  1  Abdominal Aortic Aneurysm (AAA) Screening: covered once if your at risk  You're considered to be at risk if you have a family history of AAA  2  Lung Cancer Screening: covers low dose CT scan once per year if you meet all of the following conditions: (1) Age 50-69; (2) No signs or symptoms of lung cancer; (3) Current smoker or have quit smoking within the last 15 years; (4) You have a tobacco smoking history of at least 30 pack years (packs per day multiplied by number of years you smoked); (5) You get a written order from a healthcare provider  3  Glaucoma Screening: covered annually if you're considered high risk: (1) You have diabetes OR (2) Family history of glaucoma OR (3)  aged 48 and older OR (3)  American aged 72 and older  3  Osteoporosis Screening: covered every 2 years if you meet one of the following conditions: (1) You're estrogen deficient and at risk for osteoporosis based off medical history and other findings; (2) Have a vertebral abnormality; (3) On glucocorticoid therapy for more than 3 months; (4) Have primary hyperparathyroidism; (5) On osteoporosis medications and need to assess response to drug therapy  · Last bone density test (DXA Scan): Not on file  5  HIV Screening: covered annually if you're between the age of 12-76  Also covered annually if you are younger than 13 and older than 72 with risk factors for HIV infection   For pregnant patients, it is covered up to 3 times per pregnancy  Immunizations:  Immunization Recommendations   Influenza Vaccine Annual influenza vaccination during flu season is recommended for all persons aged >= 6 months who do not have contraindications   Pneumococcal Vaccine (Prevnar and Pneumovax)  * Prevnar = PCV13  * Pneumovax = PPSV23   Adults 25-60 years old: 1-3 doses may be recommended based on certain risk factors  Adults 72 years old: Prevnar (PCV13) vaccine recommended followed by Pneumovax (PPSV23) vaccine  If already received PPSV23 since turning 65, then PCV13 recommended at least one year after PPSV23 dose  Hepatitis B Vaccine 3 dose series if at intermediate or high risk (ex: diabetes, end stage renal disease, liver disease)   Tetanus (Td) Vaccine - COST NOT COVERED BY MEDICARE PART B Following completion of primary series, a booster dose should be given every 10 years to maintain immunity against tetanus  Td may also be given as tetanus wound prophylaxis  Tdap Vaccine - COST NOT COVERED BY MEDICARE PART B Recommended at least once for all adults  For pregnant patients, recommended with each pregnancy  Shingles Vaccine (Shingrix) - COST NOT COVERED BY MEDICARE PART B  2 shot series recommended in those aged 48 and above     Health Maintenance Due:      Topic Date Due    Hepatitis C Screening  Never done    Breast Cancer Screening: Mammogram  09/03/2022    Colorectal Cancer Screening  11/17/2022     Immunizations Due:      Topic Date Due    COVID-19 Vaccine (3 - Moderna risk 4-dose series) 03/22/2021     Advance Directives   What are advance directives? Advance directives are legal documents that state your wishes and plans for medical care  These plans are made ahead of time in case you lose your ability to make decisions for yourself  Advance directives can apply to any medical decision, such as the treatments you want, and if you want to donate organs  What are the types of advance directives?   There are many types of advance directives, and each state has rules about how to use them  You may choose a combination of any of the following:  · Living will: This is a written record of the treatment you want  You can also choose which treatments you do not want, which to limit, and which to stop at a certain time  This includes surgery, medicine, IV fluid, and tube feedings  · Durable power of  for healthcare [facility]): This is a written record that states who you want to make healthcare choices for you when you are unable to make them for yourself  This person, called a proxy, is usually a family member or a friend  You may choose more than 1 proxy  · Do not resuscitate (DNR) order:  A DNR order is used in case your heart stops beating or you stop breathing  It is a request not to have certain forms of treatment, such as CPR  A DNR order may be included in other types of advance directives  · Medical directive: This covers the care that you want if you are in a coma, near death, or unable to make decisions for yourself  You can list the treatments you want for each condition  Treatment may include pain medicine, surgery, blood transfusions, dialysis, IV or tube feedings, and a ventilator (breathing machine)  · Values history: This document has questions about your views, beliefs, and how you feel and think about life  This information can help others choose the care that you would choose  Why are advance directives important? An advance directive helps you control your care  Although spoken wishes may be used, it is better to have your wishes written down  Spoken wishes can be misunderstood, or not followed  Treatments may be given even if you do not want them  An advance directive may make it easier for your family to make difficult choices about your care     Weight Management   Why it is important to manage your weight:  Being overweight increases your risk of health conditions such as heart disease, high blood pressure, type 2 diabetes, and certain types of cancer  It can also increase your risk for osteoarthritis, sleep apnea, and other respiratory problems  Aim for a slow, steady weight loss  Even a small amount of weight loss can lower your risk of health problems  How to lose weight safely:  A safe and healthy way to lose weight is to eat fewer calories and get regular exercise  You can lose up about 1 pound a week by decreasing the number of calories you eat by 500 calories each day  Healthy meal plan for weight management:  A healthy meal plan includes a variety of foods, contains fewer calories, and helps you stay healthy  A healthy meal plan includes the following:  · Eat whole-grain foods more often  A healthy meal plan should contain fiber  Fiber is the part of grains, fruits, and vegetables that is not broken down by your body  Whole-grain foods are healthy and provide extra fiber in your diet  Some examples of whole-grain foods are whole-wheat breads and pastas, oatmeal, brown rice, and bulgur  · Eat a variety of vegetables every day  Include dark, leafy greens such as spinach, kale, parviz greens, and mustard greens  Eat yellow and orange vegetables such as carrots, sweet potatoes, and winter squash  · Eat a variety of fruits every day  Choose fresh or canned fruit (canned in its own juice or light syrup) instead of juice  Fruit juice has very little or no fiber  · Eat low-fat dairy foods  Drink fat-free (skim) milk or 1% milk  Eat fat-free yogurt and low-fat cottage cheese  Try low-fat cheeses such as mozzarella and other reduced-fat cheeses  · Choose meat and other protein foods that are low in fat  Choose beans or other legumes such as split peas or lentils  Choose fish, skinless poultry (chicken or turkey), or lean cuts of red meat (beef or pork)  Before you cook meat or poultry, cut off any visible fat  · Use less fat and oil  Try baking foods instead of frying them   Add less fat, such as margarine, sour cream, regular salad dressing and mayonnaise to foods  Eat fewer high-fat foods  Some examples of high-fat foods include french fries, doughnuts, ice cream, and cakes  · Eat fewer sweets  Limit foods and drinks that are high in sugar  This includes candy, cookies, regular soda, and sweetened drinks  Exercise:  Exercise at least 30 minutes per day on most days of the week  Some examples of exercise include walking, biking, dancing, and swimming  You can also fit in more physical activity by taking the stairs instead of the elevator or parking farther away from stores  Ask your healthcare provider about the best exercise plan for you  © Copyright [employer] 2018 Information is for End User's use only and may not be sold, redistributed or otherwise used for commercial purposes   All illustrations and images included in CareNotes® are the copyrighted property of [employer]  or [address] [place] [address]

## 2022-05-10 NOTE — PROGRESS NOTES
Assessment/Plan:    Mixed hyperlipidemia  Early elevated LDL cholesterol  Discussed with patient regarding change of medication  Patient would like to have about 6 months to stay modify her diet to see if this can be adjusted with a change in her diet  Have given her script for repeat lipid panel prior to next visit  Also recommend about 20 minutes of walking 5 times a week for cardiovascular fitness and to help with reducing cholesterol level  Epistaxis  Nose bleeds a fairly mild  Usually from the right nostril  Usually stops spontaneously  Recommend saline solution for right nostril to see if that helps with nose bleeds  Recommend referral to ENT if the bleeding persists    Screening for osteoporosis  Patient believes that she has had a recent DEXA scan  I and will like to check her records to see if another DEXA scan is due at this time  Will hold off on scheduling another 1 at this time  BMI Counseling: Body mass index is 26 62 kg/m²  The BMI is above normal  Nutrition recommendations include reducing portion sizes, decreasing overall calorie intake and 3-5 servings of fruits/vegetables daily  Exercise recommendations include moderate aerobic physical activity for 150 minutes/week  Diagnoses and all orders for this visit:    Hypothyroidism due to acquired atrophy of thyroid  -     TSH, 3rd generation with Free T4 reflex; Future    Rheumatoid arthritis of multiple sites with negative rheumatoid factor (HCC)    Mixed hyperlipidemia  -     Lipid Panel with Direct LDL reflex; Future    Other orders  -     Prolensa 0 07 % SOLN; Use in the morning  -     Difluprednate 0 05 % EMUL; 4 (four) times a day Rt eye  4 times per day  -     Multiple Minerals-Vitamins (CITRACAL PLUS PO); Take by mouth 200 mg daily  -     Cholecalciferol (Vitamin D-3) 25 MCG (1000 UT) CAPS;  Take by mouth in the morning        Subjective:   Chief Complaint   Patient presents with    Follow-up     6 month fu - labs 4/29/22- hypothyroidism -  BMI FU NEEDED    Medicare Wellness Visit     AWV-SUB        Patient ID: Regina Bustos is a 68 y o  female  HPI      Here today for her follow up and Medicare wellness  Overall feels well, is compliant with her meds and does not have much to complain about  During her recent visit she was told that her cholesterol was a little high and she is trying to modify her diet to help bring that down  The following portions of the patient's history were reviewed and updated as appropriate: past family history, past medical history, past social history, past surgical history and problem list     Review of Systems   HENT: Positive for nosebleeds  Musculoskeletal: Positive for arthralgias  All other systems reviewed and are negative          Past Medical History:   Diagnosis Date    Cataract     Disease of thyroid gland     hypothyrodism     Hypertension     Lumbago     Pure hypercholesterolemia     Rheumatoid arthritis ([facility] [address] )     Stenosis of rectum and anus          Current Outpatient Medications:     alendronate (FOSAMAX) 70 mg tablet, Take 1 tablet (70 mg total) by mouth weekly before breakfast, Disp: 12 tablet, Rfl: 1    amLODIPine (NORVASC) 5 mg tablet, Take 1 tablet (5 mg total) by mouth daily, Disp: 90 tablet, Rfl: 1    Cholecalciferol (Vitamin D-3) 25 MCG (1000 UT) CAPS, Take by mouth in the morning, Disp: , Rfl:     Difluprednate 0 05 % EMUL, 4 (four) times a day Rt eye  4 times per day, Disp: , Rfl:     folic acid (FOLVITE) 1 mg tablet, Take 800 mcg by mouth 4 (four) times a week , Disp: , Rfl:     levothyroxine 88 mcg tablet, Take 1 tablet (88 mcg total) by mouth daily, Disp: 90 tablet, Rfl: 1    lisinopril (ZESTRIL) 20 mg tablet, Take 1 tablet (20 mg total) by mouth daily, Disp: 90 tablet, Rfl: 1    lovastatin (MEVACOR) 20 mg tablet, Take 1 tablet (20 mg total) by mouth daily at bedtime, Disp: 90 tablet, Rfl: 1    methotrexate 2 5 mg tablet, Take 15 mg by mouth once a week , Disp: , Rfl:     Multiple Minerals-Vitamins (CITRACAL PLUS PO), Take by mouth 200 mg daily, Disp: , Rfl:     multivitamin (THERAGRAN) TABS, Take 1 tablet by mouth 4 (four) times a week , Disp: , Rfl:     Prolensa 0 07 % SOLN, Use in the morning, Disp: , Rfl:     Allergies   Allergen Reactions    Penicillins Hives       Social History   Past Surgical History:   Procedure Laterality Date    CATARACT EXTRACTION Bilateral     CERVIX SURGERY      HEMORROIDECTOMY      [facility]ORICAL)  2020    [facility]    MENISCECTOMY      [place] COLONOSCOPY FLX DX W/COLLJ SPEC WHEN PFRMD N/A 11/17/2017    Procedure: COLONOSCOPY;  Surgeon: Zka Savage MD;  Location: [facility];   Service: Colorectal     Family History   Problem Relation Age of Onset    Breast cancer Mother 47    No Known Problems Father     Prostate cancer Brother 71    Breast cancer Maternal Aunt 61    Prostate cancer Paternal Uncle [de-identified]    Endometrial cancer Cousin 62    No Known Problems Daughter     No Known Problems Maternal Grandmother     No Known Problems Maternal Grandfather     No Known Problems Paternal Grandmother     No Known Problems Paternal Grandfather     No Known Problems Daughter        Objective:  /58 (BP Location: Left arm, Patient Position: Sitting, Cuff Size: Standard)   Pulse 74   Temp 97 7 °F (36 5 °C) (Tympanic)   Ht 5' 1 02" (1 55 m)   Wt 64 kg (141 lb)   SpO2 98%   BMI 26 62 kg/m²     Recent Results (from the past 1344 hour(s))   LDL cholesterol, direct    Collection Time: 04/29/22  8:07 AM   Result Value Ref Range    LDL Direct 116 (H) <100 mg/dL   Comprehensive metabolic panel    Collection Time: 04/29/22  8:07 AM   Result Value Ref Range    Glucose, Random 82 65 - 99 mg/dL    BUN 12 7 - 25 mg/dL    Creatinine 0 71 0 60 - 0 93 mg/dL    eGFR Non  83 > OR = 60 mL/min/1 73m2    eGFR  96 > OR = 60 mL/min/1 73m2    SL AMB BUN/CREATININE RATIO NOT APPLICABLE 6 - 22 (calc)    Sodium 136 135 - 146 mmol/L    Potassium 4 0 3 5 - 5 3 mmol/L    Chloride 99 98 - 110 mmol/L    CO2 30 20 - 32 mmol/L    Calcium 9 1 8 6 - 10 4 mg/dL    Protein, Total 6 7 6 1 - 8 1 g/dL    Albumin 4 2 3 6 - 5 1 g/dL    Globulin 2 5 1 9 - 3 7 g/dL (calc)    Albumin/Globulin Ratio 1 7 1 0 - 2 5 (calc)    TOTAL BILIRUBIN 0 6 0 2 - 1 2 mg/dL    Alkaline Phosphatase 66 37 - 153 U/L    AST 20 10 - 35 U/L    ALT 11 6 - 29 U/L   CBC and differential    Collection Time: 04/29/22  8:07 AM   Result Value Ref Range    White Blood Cell Count 6 0 3 8 - 10 8 Thousand/uL    Red Blood Cell Count 4 70 3 80 - 5 10 Million/uL    Hemoglobin 13 5 11 7 - 15 5 g/dL    HCT 41 3 35 0 - 45 0 %    MCV 87 9 80 0 - 100 0 fL    MCH 28 7 27 0 - 33 0 pg    MCHC 32 7 32 0 - 36 0 g/dL    RDW 14 9 11 0 - 15 0 %    Platelet Count 025 784 - 400 Thousand/uL    SL AMB MPV 9 1 7 5 - 12 5 fL    Neutrophils (Absolute) 4,122 1,500 - 7,800 cells/uL    Lymphocytes (Absolute) 1,008 850 - 3,900 cells/uL    Monocytes (Absolute) 672 200 - 950 cells/uL    Eosinophils (Absolute) 138 15 - 500 cells/uL    Basophils ABS 60 0 - 200 cells/uL    Neutrophils 68 7 %    Lymphocytes 16 8 %    Monocytes 11 2 %    Eosinophils 2 3 %    Basophils PCT 1 0 %   TSH, 3rd generation    Collection Time: 04/29/22  8:07 AM   Result Value Ref Range    TSH 2 83 0 40 - 4 50 mIU/L            Physical Exam      Gen: NAD  Heent: atraumatic, normocephalic  Mouth: is moist  Neck: is supple, no JVD, no carotid bruits     Heart: is regular Normal s1, s2,  Lungs: are clear to auscultation  Abd: soft, non tender, NABS  Ext: no edema, distal pulses normal  Skin: no rashes, ulcers  Mood: normal affect  Neuro: AAOx3

## 2022-05-10 NOTE — ASSESSMENT & PLAN NOTE
Patient believes that she has had a recent DEXA scan  I and will like to check her records to see if another DEXA scan is due at this time  Will hold off on scheduling another 1 at this time

## 2022-05-10 NOTE — ASSESSMENT & PLAN NOTE
Nose bleeds a fairly mild  Usually from the right nostril  Usually stops spontaneously  Recommend saline solution for right nostril to see if that helps with nose bleeds    Recommend referral to ENT if the bleeding persists

## 2022-06-01 DIAGNOSIS — I10 ESSENTIAL HYPERTENSION: ICD-10-CM

## 2022-06-01 RX ORDER — AMLODIPINE BESYLATE 5 MG/1
5 TABLET ORAL DAILY
Qty: 90 TABLET | Refills: 1 | Status: SHIPPED | OUTPATIENT
Start: 2022-06-01

## 2022-07-05 DIAGNOSIS — Z13.820 SCREENING FOR OSTEOPOROSIS: ICD-10-CM

## 2022-07-05 RX ORDER — ALENDRONATE SODIUM 70 MG/1
70 TABLET ORAL
Qty: 12 TABLET | Refills: 1 | Status: SHIPPED | OUTPATIENT
Start: 2022-07-05

## 2022-08-03 DIAGNOSIS — E03.9 HYPOTHYROIDISM, UNSPECIFIED TYPE: ICD-10-CM

## 2022-08-03 RX ORDER — LEVOTHYROXINE SODIUM 88 UG/1
88 TABLET ORAL DAILY
Qty: 90 TABLET | Refills: 1 | Status: SHIPPED | OUTPATIENT
Start: 2022-08-03

## 2022-08-30 ENCOUNTER — TELEPHONE (OUTPATIENT)
Dept: INTERNAL MEDICINE CLINIC | Facility: CLINIC | Age: 77
End: 2022-08-30

## 2022-08-30 DIAGNOSIS — I10 ESSENTIAL HYPERTENSION: ICD-10-CM

## 2022-08-30 RX ORDER — LISINOPRIL 20 MG/1
20 TABLET ORAL DAILY
Qty: 90 TABLET | Refills: 1 | Status: SHIPPED | OUTPATIENT
Start: 2022-08-30

## 2022-09-06 ENCOUNTER — HOSPITAL ENCOUNTER (OUTPATIENT)
Dept: RADIOLOGY | Age: 77
Discharge: HOME | End: 2022-09-06
Payer: MEDICARE

## 2022-09-06 VITALS — WEIGHT: 139 LBS | BODY MASS INDEX: 26.24 KG/M2 | HEIGHT: 61 IN

## 2022-09-06 DIAGNOSIS — Z12.31 ENCOUNTER FOR SCREENING MAMMOGRAM FOR MALIGNANT NEOPLASM OF BREAST: ICD-10-CM

## 2022-09-06 PROCEDURE — 77063 BREAST TOMOSYNTHESIS BI: CPT

## 2022-09-06 PROCEDURE — 77067 SCR MAMMO BI INCL CAD: CPT

## 2022-10-17 DIAGNOSIS — E78.2 MIXED HYPERLIPIDEMIA: ICD-10-CM

## 2022-10-17 RX ORDER — LOVASTATIN 20 MG/1
20 TABLET ORAL
Qty: 90 TABLET | Refills: 1 | Status: SHIPPED | OUTPATIENT
Start: 2022-10-17

## 2022-11-07 LAB
CHOLEST SERPL-MCNC: 176 MG/DL
CHOLEST/HDLC SERPL: 2.5 (CALC)
HDLC SERPL-MCNC: 70 MG/DL
LDLC SERPL CALC-MCNC: 90 MG/DL (CALC)
NONHDLC SERPL-MCNC: 106 MG/DL (CALC)
TRIGL SERPL-MCNC: 72 MG/DL
TSH SERPL-ACNC: 1.31 MIU/L (ref 0.4–4.5)

## 2022-11-15 ENCOUNTER — OFFICE VISIT (OUTPATIENT)
Dept: INTERNAL MEDICINE CLINIC | Facility: CLINIC | Age: 77
End: 2022-11-15

## 2022-11-15 VITALS
HEART RATE: 72 BPM | DIASTOLIC BLOOD PRESSURE: 88 MMHG | SYSTOLIC BLOOD PRESSURE: 142 MMHG | OXYGEN SATURATION: 98 % | HEIGHT: 61 IN | TEMPERATURE: 98 F | BODY MASS INDEX: 24.96 KG/M2 | WEIGHT: 132.2 LBS

## 2022-11-15 DIAGNOSIS — Z12.11 ENCOUNTER FOR COLORECTAL CANCER SCREENING: ICD-10-CM

## 2022-11-15 DIAGNOSIS — S50.812D ABRASION OF LEFT FOREARM, SUBSEQUENT ENCOUNTER: ICD-10-CM

## 2022-11-15 DIAGNOSIS — I10 ESSENTIAL HYPERTENSION: ICD-10-CM

## 2022-11-15 DIAGNOSIS — E78.2 MIXED HYPERLIPIDEMIA: ICD-10-CM

## 2022-11-15 DIAGNOSIS — M85.88 OTHER SPECIFIED DISORDERS OF BONE DENSITY AND STRUCTURE, OTHER SITE: ICD-10-CM

## 2022-11-15 DIAGNOSIS — E03.4 HYPOTHYROIDISM DUE TO ACQUIRED ATROPHY OF THYROID: ICD-10-CM

## 2022-11-15 DIAGNOSIS — R79.9 ABNORMAL FINDING OF BLOOD CHEMISTRY, UNSPECIFIED: ICD-10-CM

## 2022-11-15 DIAGNOSIS — M83.9 ADULT OSTEOMALACIA, UNSPECIFIED: ICD-10-CM

## 2022-11-15 DIAGNOSIS — I15.9 SECONDARY HYPERTENSION: ICD-10-CM

## 2022-11-15 DIAGNOSIS — Z13.820 SCREENING FOR OSTEOPOROSIS: ICD-10-CM

## 2022-11-15 DIAGNOSIS — M06.09 RHEUMATOID ARTHRITIS OF MULTIPLE SITES WITH NEGATIVE RHEUMATOID FACTOR (HCC): ICD-10-CM

## 2022-11-15 DIAGNOSIS — Z12.12 ENCOUNTER FOR COLORECTAL CANCER SCREENING: ICD-10-CM

## 2022-11-15 DIAGNOSIS — Z23 IMMUNIZATION DUE: Primary | ICD-10-CM

## 2022-11-15 NOTE — PROGRESS NOTES
Name: Michel Paulino      : 1945      MRN: [id number]  Encounter Provider: Deborah Doty MD  Encounter Date: 11/15/2022   Encounter department: [address] [place] [address]     1  Immunization due  -     TD VACCINE GREATER THAN OR EQUAL TO 6YO PRESERVATIVE FREE IM    2  Encounter for colorectal cancer screening  -     Ambulatory referral for colonoscopy; Future    3  Secondary hypertension    4  Hypothyroidism due to acquired atrophy of thyroid  Assessment & Plan:  Well controlled on present med dosage, continue to monitor    Orders:  -     DXA bone density spine hip and pelvis; Future; Expected date: 2023    5  Mixed hyperlipidemia  Assessment & Plan:  Well controlled on present lifestyle, diet and med regimen    Orders:  -     Lipid Panel with Direct LDL reflex; Future    6  Rheumatoid arthritis of multiple sites with negative rheumatoid factor ([facility] [address] )  -     DXA bone density spine hip and pelvis; Future; Expected date: 2023  -     Vitamin D 25 hydroxy; Future    7  Essential hypertension  Assessment & Plan:  Continue present med regimen    Orders:  -     CBC and differential; Future  -     Comprehensive metabolic panel; Future  -     TSH, 3rd generation with Free T4 reflex; Future  -     UA w Reflex to Microscopic w Reflex to Culture  -     Hemoglobin A1C; Future  -     Vitamin D 25 hydroxy; Future    8  Screening for osteoporosis  Assessment & Plan:  Recommend Dxa scan  Weigh bearing exercise , vit D supplementation      9  Other specified disorders of bone density and structure, other site   -     DXA bone density spine hip and pelvis; Future; Expected date: 2023    10  Abnormal finding of blood chemistry, unspecified   -     Hemoglobin A1C; Future    11  Adult osteomalacia, unspecified   -     Vitamin D 25 hydroxy; Future    12   Abrasion of left forearm, subsequent encounter   -     TD VACCINE GREATER THAN OR EQUAL TO 6YO PRESERVATIVE FREE IM      BMI Counseling: Body mass index is 25 25 kg/m²  The BMI is above normal  Exercise recommendations include exercising 3-5 times per week  Rationale for BMI follow-up plan is due to patient being overweight or obese  Depression Screening and Follow-up Plan: Patient was screened for depression during today's encounter  They screened negative with a PHQ-2 score of 0  Falls Plan of Care: Vitamin D supplementation was recommended  Subjective     Chief Complaint   Patient presents with   • Follow-up     Review labs done 11/7   • Health Screening     Colonoscopy scheduled 11/30 Dr Jose Polanco does dexa scan for pt last 9/13/2021 message sent to care [place] depression screen     HPI     Here today for follow up on HLD , RA and hypothyroidism  At her recent visit her cholesterol was mildly elevated and was told to modify her diet and to life style and ensure regular cardiovascular exercise  She follows up with Dr Jose Polanco for her rheumatoid arthritis  Overall feels well and has no new complaints  She does a fair amount of yard work and inadvertently gets herself scraped by the branches and was hence looking for an update on her tetanus vaccination  She has completed all 5 of her COVID shots  Review of Systems   HENT: Positive for postnasal drip  Musculoskeletal: Positive for arthralgias  Allergic/Immunologic: Positive for environmental allergies         Past Medical History:   Diagnosis Date   • Cataract    • Disease of thyroid gland     hypothyrodism    • Hypertension    • Lumbago    • Pure hypercholesterolemia    • Rheumatoid arthritis ([address] )    • Stenosis of rectum and anus      Past Surgical History:   Procedure Laterality Date   • CATARACT EXTRACTION Bilateral    • CERVIX SURGERY     • HEMORROIDECTOMY     • [facility]   • MENISCECTOMY     • [place] COLONOSCOPY FLX DX W/COLLJ SPEC WHEN PFRMD N/A 11/17/2017    Procedure: COLONOSCOPY;  Surgeon: Zachary Conner MD; Location: [facility];   Service: Colorectal     Family History   Problem Relation Age of Onset   • Breast cancer Mother 47   • No Known Problems Father    • Prostate cancer Brother 71   • Breast cancer Maternal Aunt 61   • Prostate cancer Paternal Uncle [de-identified]   • Endometrial cancer Cousin 62   • No Known Problems Daughter    • No Known Problems Maternal Grandmother    • No Known Problems Maternal Grandfather    • No Known Problems Paternal Grandmother    • No Known Problems Paternal Grandfather    • No Known Problems Daughter      Social History     Socioeconomic History   • Marital status: /Civil Union     Spouse name: None   • Number of children: None   • Years of education: None   • Highest education level: None   Occupational History   • None   Tobacco Use   • Smoking status: Never Smoker   • Smokeless tobacco: Never Used   Vaping Use   • Vaping Use: Never used   Substance and Sexual Activity   • Alcohol use: Yes     Comment: 1 drink [place]   • Drug use: No   • Sexual activity: Not Currently   Other Topics Concern   • None   Social History Narrative   • None     Social Determinants of Health     Financial Resource Strain: Not on file   Food Insecurity: Not on file   Transportation Needs: Not on file   Physical Activity: Not on file   Stress: Not on file   Social Connections: Not on file   Intimate Partner Violence: Not on file   Housing Stability: Not on file     Current Outpatient Medications on File Prior to Visit   Medication Sig   • alendronate (FOSAMAX) 70 mg tablet Take 1 tablet (70 mg total) by mouth weekly before breakfast   • amLODIPine (NORVASC) 5 mg tablet Take 1 tablet (5 mg total) by mouth daily   • Cholecalciferol (Vitamin D-3) 25 MCG (1000 UT) CAPS Take by mouth in the morning   • folic acid (FOLVITE) 1 mg tablet Take 800 mcg by mouth 4 (four) times a week    • levothyroxine 88 mcg tablet Take 1 tablet (88 mcg total) by mouth daily   • lisinopril (ZESTRIL) 20 mg tablet Take 1 tablet (20 mg total) by mouth daily   • lovastatin (MEVACOR) 20 mg tablet Take 1 tablet (20 mg total) by mouth daily at bedtime   • methotrexate 2 5 mg tablet Take 15 mg by mouth once a week    • Multiple Minerals-Vitamins (CITRACAL PLUS PO) Take by mouth 200 mg daily   • multivitamin (THERAGRAN) TABS Take 1 tablet by mouth 4 (four) times a week  (Patient not taking: Reported on 11/15/2022)   • [DISCONTINUED] Difluprednate 0 05 % EMUL 4 (four) times a day Rt eye  4 times per day   • [DISCONTINUED] Prolensa 0 07 % SOLN Use in the morning     Allergies   Allergen Reactions   • Penicillins Hives     Immunization History   Administered Date(s) Administered   • COVID-19 MODERNA VACC 0 5 ML IM 01/23/2021, 01/23/2021, 02/22/2021, 02/22/2021, 09/22/2022   • INFLUENZA 10/29/2018, 10/29/2018, 10/23/2019, 10/23/2019, 08/25/2020, 08/25/2020, 10/18/2021   • Influenza, high dose seasonal 0 7 mL 10/18/2021, 10/07/2022   • MMR 10/17/2017, 10/17/2017   • Pneumococcal Conjugate 13-Valent 03/30/2016, 03/30/2016   • Pneumococcal Polysaccharide PPV23 12/09/2013, 12/09/2013   • Tdap 10/17/2016, 10/17/2016   • Zoster 07/16/2015, 07/16/2015   • Zoster Vaccine Recombinant 02/05/2019, 06/09/2019       Objective     /88 (BP Location: Left arm, Patient Position: Sitting, Cuff Size: Standard)   Pulse 72   Temp 98 °F (36 7 °C) (Temporal)   Ht 5' 0 67" (1 541 m)   Wt 60 kg (132 lb 3 2 oz)   SpO2 98%   BMI 25 25 kg/m²     Physical Exam     Gen: NAD  Heent: atraumatic, normocephalic, mild exophthalmos  Mouth: is moist  Neck: is supple, no JVD, no carotid bruits     Heart: is regular Normal s1, s2,  Lungs: are clear to auscultation  Back:  Mild thoracic kyphosis  Abd: soft, non tender, NABS  Ext: no edema, distal pulses normal  Skin: no rashes, ulcers  Mood: normal affect  Neuro: AAOx3  Candy Russell MD

## 2022-11-17 ENCOUNTER — TELEPHONE (OUTPATIENT)
Dept: ADMINISTRATIVE | Facility: OTHER | Age: 77
End: 2022-11-17

## 2022-11-17 NOTE — TELEPHONE ENCOUNTER
----- Message from Brisa Mcdonald sent at 11/17/2022  7:34 AM EST -----  Regarding: dexa scan    Katlyn Bob 11/17/22 7:34 AM    Hello, our patient Brain Friends has had DEXA Scan completed/performed  Please assist in updating the patient chart by making an External outreach to [facility] located in [place]  The date of service is 09/13/2021      Thank you,  Brisa Mcdonald  [address]

## 2022-11-17 NOTE — LETTER
Procedure Request Form: DEXA Scan      Date Requested: 22  Patient: Emmanuel Osler  Patient : 1945   Referring Provider: Shirley Painting, MD        Date of Procedure ______________________________       The above patient has informed us that they have completed their   most recent DEXA Scan at your facility  Please complete   this form and attach all corresponding procedure reports/results  Comments __________________________________________________________  ____________________________________________________________________  ____________________________________________________________________  ____________________________________________________________________    Facility Completing Procedure _________________________________________    Form Completed By (print name) _______________________________________      Signature __________________________________________________________      These reports are needed for  compliance  Please fax this completed form and a copy of the procedure report to our office located at [place] [address] as soon as possible to [phone] delia Johnson: Phone [phone]    We thank you for your assistance in treating our mutual patient

## 2022-11-21 NOTE — TELEPHONE ENCOUNTER
Upon review of the In Basket request and the patient's chart, initial outreach has been made via fax to facility  , please see Contacts section for details       Thank you  Timur Diggs MA

## 2022-11-22 NOTE — TELEPHONE ENCOUNTER
Upon review of the In Basket request we were able to locate, review, and update the patient chart as requested for DEXA Scan  Any additional questions or concerns should be emailed to the Practice Liaisons via the appropriate education email address, please do not reply via In Basket      Thank you  Ginna Melara MA

## 2022-12-06 ENCOUNTER — TELEPHONE (OUTPATIENT)
Dept: ADMINISTRATIVE | Facility: OTHER | Age: 77
End: 2022-12-06

## 2022-12-06 NOTE — TELEPHONE ENCOUNTER
Upon review of the In Basket request and the patient's chart, initial outreach has been made via fax to facility  Please see Contacts section for details       Thank you  Alexys Coleman

## 2022-12-06 NOTE — LETTER
Procedure Request Form: Colonoscopy      Date Requested: 22  Patient: Melinda Ronquillo  Patient : 1945   Referring Provider: Yomaira Rivera MD        Date of Procedure ______________________________       The above patient has informed us that they have completed their   most recent Colonoscopy at your facility  Please complete   this form and attach all corresponding procedure reports/results  Comments DOS: 2022  ____________________________________________________________________  ____________________________________________________________________  ____________________________________________________________________    Facility Completing Procedure _________________________________________    Form Completed By (print name) _______________________________________      Signature __________________________________________________________      These reports are needed for  compliance  Please fax this completed form and a copy of the procedure report to our office located at [place] [address] as soon as possible to [phone] delia Rajput: Phone [phone]    We thank you for your assistance in treating our mutual patient

## 2022-12-06 NOTE — TELEPHONE ENCOUNTER
----- Message from Ephraim Noel RN sent at 12/2/2022 12:46 PM EST -----  12/02/22 12:47 PM    Hello, our patient Yolanda Reyes has had CRC: Colonoscopy completed/performed  Please assist in updating the patient chart by pulling the document from the Media Tab  The date of service is 11/30/22       Thank you,  Shemar Cameron, RN  [address] [place] [address] PRIMARY CARE [facility] Duty

## 2022-12-07 DIAGNOSIS — E78.2 MIXED HYPERLIPIDEMIA: ICD-10-CM

## 2022-12-07 DIAGNOSIS — I10 ESSENTIAL HYPERTENSION: ICD-10-CM

## 2022-12-07 RX ORDER — AMLODIPINE BESYLATE 5 MG/1
5 TABLET ORAL DAILY
Qty: 90 TABLET | Refills: 1 | Status: SHIPPED | OUTPATIENT
Start: 2022-12-07

## 2022-12-07 RX ORDER — LISINOPRIL 20 MG/1
20 TABLET ORAL DAILY
Qty: 90 TABLET | Refills: 1 | Status: SHIPPED | OUTPATIENT
Start: 2022-12-07

## 2022-12-07 NOTE — TELEPHONE ENCOUNTER
Upon review of the In Basket request we were able to locate, review, and update the patient chart as requested for CRC: Colonoscopy  Any additional questions or concerns should be emailed to the Practice Liaisons via the appropriate education email address, please do not reply via In Basket      Thank you  Michelle Ibrahim

## 2022-12-20 DIAGNOSIS — Z13.820 SCREENING FOR OSTEOPOROSIS: ICD-10-CM

## 2022-12-20 RX ORDER — ALENDRONATE SODIUM 70 MG/1
70 TABLET ORAL
Qty: 12 TABLET | Refills: 1 | Status: SHIPPED | OUTPATIENT
Start: 2022-12-20

## 2023-01-30 DIAGNOSIS — E03.9 HYPOTHYROIDISM, UNSPECIFIED TYPE: ICD-10-CM

## 2023-01-30 RX ORDER — LEVOTHYROXINE SODIUM 88 UG/1
88 TABLET ORAL DAILY
Qty: 90 TABLET | Refills: 1 | Status: SHIPPED | OUTPATIENT
Start: 2023-01-30

## 2023-04-26 DIAGNOSIS — E78.2 MIXED HYPERLIPIDEMIA: ICD-10-CM

## 2023-04-26 RX ORDER — LOVASTATIN 20 MG/1
20 TABLET ORAL
Qty: 90 TABLET | Refills: 1 | Status: SHIPPED | OUTPATIENT
Start: 2023-04-26

## 2023-05-08 ENCOUNTER — TELEPHONE (OUTPATIENT)
Dept: INTERNAL MEDICINE CLINIC | Facility: CLINIC | Age: 78
End: 2023-05-08

## 2023-05-08 DIAGNOSIS — I10 ESSENTIAL HYPERTENSION: Primary | ICD-10-CM

## 2023-05-08 NOTE — TELEPHONE ENCOUNTER
[facility] called stating for UA that was ordered, due to patient having medicare, with cpt code that was used, it covers the UA w reflex but not the reflex to culture  If new order with adjusted code can be placed

## 2023-05-09 LAB
25(OH)D3 SERPL-MCNC: 54 NG/ML (ref 30–100)
ALBUMIN SERPL-MCNC: 4.1 G/DL (ref 3.6–5.1)
ALBUMIN/GLOB SERPL: 1.8 (CALC) (ref 1–2.5)
ALP SERPL-CCNC: 65 U/L (ref 37–153)
ALT SERPL-CCNC: 10 U/L (ref 6–29)
AST SERPL-CCNC: 18 U/L (ref 10–35)
BASOPHILS # BLD AUTO: 69 CELLS/UL (ref 0–200)
BASOPHILS NFR BLD AUTO: 1.4 %
BILIRUB SERPL-MCNC: 0.6 MG/DL (ref 0.2–1.2)
BUN SERPL-MCNC: 13 MG/DL (ref 7–25)
BUN/CREAT SERPL: NORMAL (CALC) (ref 6–22)
CALCIUM SERPL-MCNC: 8.9 MG/DL (ref 8.6–10.4)
CHLORIDE SERPL-SCNC: 101 MMOL/L (ref 98–110)
CHOLEST SERPL-MCNC: 190 MG/DL
CHOLEST/HDLC SERPL: 2.9 (CALC)
CO2 SERPL-SCNC: 28 MMOL/L (ref 20–32)
CREAT SERPL-MCNC: 0.69 MG/DL (ref 0.6–1)
EOSINOPHIL # BLD AUTO: 191 CELLS/UL (ref 15–500)
EOSINOPHIL NFR BLD AUTO: 3.9 %
ERYTHROCYTE [DISTWIDTH] IN BLOOD BY AUTOMATED COUNT: 14.3 % (ref 11–15)
GFR/BSA.PRED SERPLBLD CYS-BASED-ARV: 89 ML/MIN/1.73M2
GLOBULIN SER CALC-MCNC: 2.3 G/DL (CALC) (ref 1.9–3.7)
GLUCOSE SERPL-MCNC: 86 MG/DL (ref 65–99)
HBA1C MFR BLD: 5.6 % OF TOTAL HGB
HCT VFR BLD AUTO: 37.8 % (ref 35–45)
HDLC SERPL-MCNC: 66 MG/DL
HGB BLD-MCNC: 12.5 G/DL (ref 11.7–15.5)
LDLC SERPL CALC-MCNC: 107 MG/DL (CALC)
LYMPHOCYTES # BLD AUTO: 1034 CELLS/UL (ref 850–3900)
LYMPHOCYTES NFR BLD AUTO: 21.1 %
MCH RBC QN AUTO: 29.3 PG (ref 27–33)
MCHC RBC AUTO-ENTMCNC: 33.1 G/DL (ref 32–36)
MCV RBC AUTO: 88.5 FL (ref 80–100)
MONOCYTES # BLD AUTO: 470 CELLS/UL (ref 200–950)
MONOCYTES NFR BLD AUTO: 9.6 %
NEUTROPHILS # BLD AUTO: 3136 CELLS/UL (ref 1500–7800)
NEUTROPHILS NFR BLD AUTO: 64 %
NONHDLC SERPL-MCNC: 124 MG/DL (CALC)
PLATELET # BLD AUTO: 270 THOUSAND/UL (ref 140–400)
PMV BLD REES-ECKER: 9.1 FL (ref 7.5–12.5)
POTASSIUM SERPL-SCNC: 4.1 MMOL/L (ref 3.5–5.3)
PROT SERPL-MCNC: 6.4 G/DL (ref 6.1–8.1)
RBC # BLD AUTO: 4.27 MILLION/UL (ref 3.8–5.1)
SODIUM SERPL-SCNC: 137 MMOL/L (ref 135–146)
TRIGL SERPL-MCNC: 82 MG/DL
TSH SERPL-ACNC: 1.36 MIU/L (ref 0.4–4.5)
WBC # BLD AUTO: 4.9 THOUSAND/UL (ref 3.8–10.8)

## 2023-05-09 NOTE — TELEPHONE ENCOUNTER
Ordered U/A reflex to scope  PT will have collected at office visit when she comes for her appt to sent to lab

## 2023-05-17 ENCOUNTER — OFFICE VISIT (OUTPATIENT)
Dept: INTERNAL MEDICINE CLINIC | Facility: CLINIC | Age: 78
End: 2023-05-17

## 2023-05-17 VITALS
HEART RATE: 71 BPM | SYSTOLIC BLOOD PRESSURE: 122 MMHG | DIASTOLIC BLOOD PRESSURE: 80 MMHG | TEMPERATURE: 97.6 F | WEIGHT: 136 LBS | OXYGEN SATURATION: 98 % | BODY MASS INDEX: 26.7 KG/M2 | HEIGHT: 60 IN

## 2023-05-17 DIAGNOSIS — M06.09 RHEUMATOID ARTHRITIS OF MULTIPLE SITES WITH NEGATIVE RHEUMATOID FACTOR (HCC): ICD-10-CM

## 2023-05-17 DIAGNOSIS — I10 ESSENTIAL HYPERTENSION: Primary | ICD-10-CM

## 2023-05-17 DIAGNOSIS — E78.2 MIXED HYPERLIPIDEMIA: ICD-10-CM

## 2023-05-17 DIAGNOSIS — Z00.00 MEDICARE ANNUAL WELLNESS VISIT, SUBSEQUENT: ICD-10-CM

## 2023-05-17 DIAGNOSIS — I83.893 VARICOSE VEINS OF BILATERAL LOWER EXTREMITIES WITH OTHER COMPLICATIONS: ICD-10-CM

## 2023-05-17 NOTE — PATIENT INSTRUCTIONS
Medicare Preventive Visit Patient Instructions  Thank you for completing your Welcome to Medicare Visit or Medicare Annual Wellness Visit today  Your next wellness visit will be due in one year (5/17/2024)  The screening/preventive services that you may require over the next 5-10 years are detailed below  Some tests may not apply to you based off risk factors and/or age  Screening tests ordered at today's visit but not completed yet may show as past due  Also, please note that scanned in results may not display below  Preventive Screenings:  Service Recommendations Previous Testing/Comments   Colorectal Cancer Screening  * Colonoscopy    * Fecal Occult Blood Test (FOBT)/Fecal Immunochemical Test (FIT)  * Fecal DNA/Cologuard Test  * Flexible Sigmoidoscopy Age: 39-70 years old   Colonoscopy: every 10 years (may be performed more frequently if at higher risk)  OR  FOBT/FIT: every 1 year  OR  Cologuard: every 3 years  OR  Sigmoidoscopy: every 5 years  Screening may be recommended earlier than age 39 if at higher risk for colorectal cancer  Also, an individualized decision between you and your healthcare provider will decide whether screening between the ages of 74-80 would be appropriate  Colonoscopy: 11/30/2022  FOBT/FIT: Not on file  Cologuard: Not on file  Sigmoidoscopy: Not on file    Screening Current     Breast Cancer Screening Age: 36 years old  Frequency: every 1-2 years  Not required if history of left and right mastectomy Mammogram: 09/06/2022    Screening Current   Cervical Cancer Screening Between the ages of 21-29, pap smear recommended once every 3 years  Between the ages of 33-67, can perform pap smear with HPV co-testing every 5 years     Recommendations may differ for women with a history of total hysterectomy, cervical cancer, or abnormal pap smears in past  Pap Smear: 08/25/2021    Screening Not Indicated   Hepatitis C Screening Once for adults born between 1945 and 1965  More frequently in patients at high risk for Hepatitis C Hep C Antibody: Not on file        Diabetes Screening 1-2 times per year if you're at risk for diabetes or have pre-diabetes Fasting glucose: No results in last 5 years (No results in last 5 years)  A1C: 5 6 % of total Hgb (5/8/2023)  Screening Current   Cholesterol Screening Once every 5 years if you don't have a lipid disorder  May order more often based on risk factors  Lipid panel: 05/08/2023    Screening Not Indicated  History Lipid Disorder     Other Preventive Screenings Covered by Medicare:  1  Abdominal Aortic Aneurysm (AAA) Screening: covered once if your at risk  You're considered to be at risk if you have a family history of AAA  2  Lung Cancer Screening: covers low dose CT scan once per year if you meet all of the following conditions: (1) Age 50-69; (2) No signs or symptoms of lung cancer; (3) Current smoker or have quit smoking within the last 15 years; (4) You have a tobacco smoking history of at least 20 pack years (packs per day multiplied by number of years you smoked); (5) You get a written order from a healthcare provider  3  Glaucoma Screening: covered annually if you're considered high risk: (1) You have diabetes OR (2) Family history of glaucoma OR (3)  aged 48 and older OR (3)  American aged 72 and older  3  Osteoporosis Screening: covered every 2 years if you meet one of the following conditions: (1) You're estrogen deficient and at risk for osteoporosis based off medical history and other findings; (2) Have a vertebral abnormality; (3) On glucocorticoid therapy for more than 3 months; (4) Have primary hyperparathyroidism; (5) On osteoporosis medications and need to assess response to drug therapy  · Last bone density test (DXA Scan): 09/13/2021   5  HIV Screening: covered annually if you're between the age of 15-65  Also covered annually if you are younger than 13 and older than 72 with risk factors for HIV infection   For pregnant patients, it is covered up to 3 times per pregnancy  Immunizations:  Immunization Recommendations   Influenza Vaccine Annual influenza vaccination during flu season is recommended for all persons aged >= 6 months who do not have contraindications   Pneumococcal Vaccine   * Pneumococcal conjugate vaccine = PCV13 (Prevnar 13), PCV15 (Vaxneuvance), PCV20 (Prevnar 20)  * Pneumococcal polysaccharide vaccine = PPSV23 (Pneumovax) Adults 25-60 years old: 1-3 doses may be recommended based on certain risk factors  Adults 72 years old: 1-2 doses may be recommended based off what pneumonia vaccine you previously received   Hepatitis B Vaccine 3 dose series if at intermediate or high risk (ex: diabetes, end stage renal disease, liver disease)   Tetanus (Td) Vaccine - COST NOT COVERED BY MEDICARE PART B Following completion of primary series, a booster dose should be given every 10 years to maintain immunity against tetanus  Td may also be given as tetanus wound prophylaxis  Tdap Vaccine - COST NOT COVERED BY MEDICARE PART B Recommended at least once for all adults  For pregnant patients, recommended with each pregnancy  Shingles Vaccine (Shingrix) - COST NOT COVERED BY MEDICARE PART B  2 shot series recommended in those aged 48 and above     Health Maintenance Due:      Topic Date Due   • Hepatitis C Screening  Never done   • Breast Cancer Screening: Mammogram  09/06/2023   • Colorectal Cancer Screening  11/30/2029     Immunizations Due:      Topic Date Due   • COVID-19 Vaccine (6 - Booster) 11/17/2022     Advance Directives   What are advance directives? Advance directives are legal documents that state your wishes and plans for medical care  These plans are made ahead of time in case you lose your ability to make decisions for yourself  Advance directives can apply to any medical decision, such as the treatments you want, and if you want to donate organs  What are the types of advance directives?   There are many types of advance directives, and each state has rules about how to use them  You may choose a combination of any of the following:  · Living will: This is a written record of the treatment you want  You can also choose which treatments you do not want, which to limit, and which to stop at a certain time  This includes surgery, medicine, IV fluid, and tube feedings  · Durable power of  for healthcare [facility]): This is a written record that states who you want to make healthcare choices for you when you are unable to make them for yourself  This person, called a proxy, is usually a family member or a friend  You may choose more than 1 proxy  · Do not resuscitate (DNR) order:  A DNR order is used in case your heart stops beating or you stop breathing  It is a request not to have certain forms of treatment, such as CPR  A DNR order may be included in other types of advance directives  · Medical directive: This covers the care that you want if you are in a coma, near death, or unable to make decisions for yourself  You can list the treatments you want for each condition  Treatment may include pain medicine, surgery, blood transfusions, dialysis, IV or tube feedings, and a ventilator (breathing machine)  · Values history: This document has questions about your views, beliefs, and how you feel and think about life  This information can help others choose the care that you would choose  Why are advance directives important? An advance directive helps you control your care  Although spoken wishes may be used, it is better to have your wishes written down  Spoken wishes can be misunderstood, or not followed  Treatments may be given even if you do not want them  An advance directive may make it easier for your family to make difficult choices about your care     Weight Management   Why it is important to manage your weight:  Being overweight increases your risk of health conditions such as heart disease, high blood pressure, type 2 diabetes, and certain types of cancer  It can also increase your risk for osteoarthritis, sleep apnea, and other respiratory problems  Aim for a slow, steady weight loss  Even a small amount of weight loss can lower your risk of health problems  How to lose weight safely:  A safe and healthy way to lose weight is to eat fewer calories and get regular exercise  You can lose up about 1 pound a week by decreasing the number of calories you eat by 500 calories each day  Healthy meal plan for weight management:  A healthy meal plan includes a variety of foods, contains fewer calories, and helps you stay healthy  A healthy meal plan includes the following:  · Eat whole-grain foods more often  A healthy meal plan should contain fiber  Fiber is the part of grains, fruits, and vegetables that is not broken down by your body  Whole-grain foods are healthy and provide extra fiber in your diet  Some examples of whole-grain foods are whole-wheat breads and pastas, oatmeal, brown rice, and bulgur  · Eat a variety of vegetables every day  Include dark, leafy greens such as spinach, kale, parviz greens, and mustard greens  Eat yellow and orange vegetables such as carrots, sweet potatoes, and winter squash  · Eat a variety of fruits every day  Choose fresh or canned fruit (canned in its own juice or light syrup) instead of juice  Fruit juice has very little or no fiber  · Eat low-fat dairy foods  Drink fat-free (skim) milk or 1% milk  Eat fat-free yogurt and low-fat cottage cheese  Try low-fat cheeses such as mozzarella and other reduced-fat cheeses  · Choose meat and other protein foods that are low in fat  Choose beans or other legumes such as split peas or lentils  Choose fish, skinless poultry (chicken or turkey), or lean cuts of red meat (beef or pork)  Before you cook meat or poultry, cut off any visible fat  · Use less fat and oil  Try baking foods instead of frying them  Add less fat, such as margarine, sour cream, regular salad dressing and mayonnaise to foods  Eat fewer high-fat foods  Some examples of high-fat foods include french fries, doughnuts, ice cream, and cakes  · Eat fewer sweets  Limit foods and drinks that are high in sugar  This includes candy, cookies, regular soda, and sweetened drinks  Exercise:  Exercise at least 30 minutes per day on most days of the week  Some examples of exercise include walking, biking, dancing, and swimming  You can also fit in more physical activity by taking the stairs instead of the elevator or parking farther away from stores  Ask your healthcare provider about the best exercise plan for you  © Copyright [employer] 2018 Information is for End User's use only and may not be sold, redistributed or otherwise used for commercial purposes   All illustrations and images included in CareNotes® are the copyrighted property of [employer]  or [address] [place] [address]

## 2023-05-17 NOTE — PROGRESS NOTES
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 factor ([address] )       Other    Mixed hyperlipidemia    Relevant Orders    Lipid Panel with Direct LDL reflex   Other Visit Diagnoses     Medicare annual wellness visit, subsequent            BMI Counseling: Body mass index is 26 39 kg/m²  The BMI is above normal  Nutrition recommendations include encouraging healthy choices of fruits and vegetables  Exercise recommendations include exercising 3-5 times per week  Rationale for BMI follow-up plan is due to patient being overweight or obese  Depression Screening and Follow-up Plan: Patient was screened for depression during today's encounter  They screened negative with a PHQ-2 score of 1  Falls Plan of Care: Vitamin D supplementation was recommended  Preventive health issues were discussed with patient, and age appropriate screening tests were ordered as noted in patient's After Visit Summary  Personalized health advice and appropriate referrals for health education or preventive services given if needed, as noted in patient's After Visit Summary  History of Present Illness:     Patient presents for a Medicare Wellness Visit and follow up    Hypertension  This is a chronic problem  The current episode started more than 1 year ago  The problem has been gradually improving since onset  The problem is controlled (mildly elevated at visit)  Risk factors for coronary artery disease include dyslipidemia and post-menopausal state  The current treatment provides moderate improvement  There are no compliance problems    Identifiable causes of hypertension include a thyroid problem  Thyroid Problem  Presents for follow-up visit  The symptoms have been stable  Her past medical history is significant for hyperlipidemia  Hyperlipidemia  This is a chronic problem  The current episode started more than 1 year ago  The problem is controlled  Recent lipid tests were reviewed and are normal (LDL slightly elevated)  Current antihyperlipidemic treatment includes statins and exercise  The current treatment provides moderate improvement of lipids  There are no compliance problems  Patient Care Team:  Gui Louis MD as PCP - General (Internal Medicine)  Luke Cook MD as Endoscopist  Jorge Alberto Somers MD (Rheumatology)  Kassi Martinez MD (Ophthalmology)     Review of Systems:     Review of Systems   Cardiovascular: Positive for leg swelling  Musculoskeletal: Positive for arthralgias and joint swelling  All other systems reviewed and are negative         Problem List:     Patient Active Problem List   Diagnosis   • Encounter for gynecological examination (general) (routine) without abnormal findings   • Screening for malignant neoplasm of breast   • Dense breast   • Screening for osteoporosis   • Hypothyroidism due to acquired atrophy of thyroid   • Mixed hyperlipidemia   • Rheumatoid arthritis of multiple sites with negative rheumatoid factor ([facility] [address] )   • Preoperative clearance   • Epistaxis   • Essential hypertension   • Varicose veins of bilateral lower extremities with other complications      Past Medical and Surgical History:     Past Medical History:   Diagnosis Date   • Cataract    • Disease of thyroid gland     hypothyrodism    • Hypertension    • Lumbago    • Pure hypercholesterolemia    • Rheumatoid arthritis ([address] )    • Stenosis of rectum and anus      Past Surgical History:   Procedure Laterality Date   • CATARACT EXTRACTION Bilateral    • CERVIX SURGERY     • HEMORROIDECTOMY     • MAMMO (HISTORICAL)  2020    [facility]   • MENISCECTOMY     • LA COLONOSCOPY FLX DX W/COLLJ SPEC WHEN PFRMD N/A 11/17/2017    Procedure: COLONOSCOPY;  Surgeon: Sarika White MD;  Location: [facility]; Service: Colorectal      Family History:     Family History   Problem Relation Age of Onset   • Breast cancer Mother 47   • No Known Problems Father    • Prostate cancer Brother 71   • Breast cancer Maternal Aunt 61   • Prostate cancer Paternal Uncle [de-identified]   • Endometrial cancer Cousin 62   • No Known Problems Daughter    • No Known Problems Maternal Grandmother    • No Known Problems Maternal Grandfather    • No Known Problems Paternal Grandmother    • No Known Problems Paternal Grandfather    • No Known Problems Daughter       Social History:     Social History     Socioeconomic History   • Marital status: /Civil Union     Spouse name: None   • Number of children: None   • Years of education: None   • Highest education level: None   Occupational History   • None   Tobacco Use   • Smoking status: Never   • Smokeless tobacco: Never   Vaping Use   • Vaping Use: Never used   Substance and Sexual Activity   • Alcohol use: Yes     Comment: 1 drink new yeasrs oren   • Drug use: No   • Sexual activity: Not Currently   Other Topics Concern   • None   Social History Narrative   • None     Social Determinants of Health     Financial Resource Strain: Low Risk    • Difficulty of Paying Living Expenses: Not hard at all   Food Insecurity: Not on file   Transportation Needs: No Transportation Needs   • Lack of Transportation (Medical): No   • Lack of Transportation (Non-Medical):  No   Physical Activity: Not on file   Stress: Not on file   Social Connections: Not on file   Intimate Partner Violence: Not on file   Housing Stability: Not on file      Medications and Allergies:     Current Outpatient Medications   Medication Sig Dispense Refill   • alendronate (FOSAMAX) 70 mg tablet Take 1 tablet (70 mg total) by mouth weekly before breakfast 12 tablet 1   • amLODIPine (NORVASC) 5 mg tablet Take 1 tablet (5 mg total) by mouth daily 90 tablet 1   • Cholecalciferol (Vitamin D-3) 25 MCG (1000 UT) CAPS Take by mouth in the morning     • folic acid (FOLVITE) 1 mg tablet Take 800 mcg by mouth 4 (four) times a week      • levothyroxine 88 mcg tablet Take 1 tablet (88 mcg total) by mouth daily 90 tablet 1   • lisinopril (ZESTRIL) 20 mg tablet Take 1 tablet (20 mg total) by mouth daily 90 tablet 1   • lovastatin (MEVACOR) 20 mg tablet Take 1 tablet (20 mg total) by mouth daily at bedtime 90 tablet 1   • methotrexate 2 5 mg tablet Take 15 mg by mouth once a week      • Multiple Minerals-Vitamins (CITRACAL PLUS PO) Take by mouth 200 mg daily     • multivitamin (THERAGRAN) TABS Take 1 tablet by mouth 4 (four) times a week  (Patient not taking: Reported on 11/15/2022)       No current facility-administered medications for this visit  Allergies   Allergen Reactions   • Penicillins Hives      Immunizations:     Immunization History   Administered Date(s) Administered   • COVID-19 MODERNA VACC 0 5 ML IM 01/23/2021, 01/23/2021, 02/22/2021, 02/22/2021, 09/22/2022   • INFLUENZA 10/29/2018, 10/29/2018, 10/23/2019, 10/23/2019, 08/25/2020, 08/25/2020, 10/18/2021   • Influenza, high dose seasonal 0 7 mL 10/18/2021, 10/07/2022   • MMR 10/17/2017, 10/17/2017   • Pneumococcal Conjugate 13-Valent 03/30/2016, 03/30/2016   • Pneumococcal Polysaccharide PPV23 12/09/2013, 12/09/2013   • Tdap 10/17/2016, 10/17/2016   • Zoster 07/16/2015, 07/16/2015   • Zoster Vaccine Recombinant 02/05/2019, 06/09/2019      Health Maintenance:         Topic Date Due   • Hepatitis C Screening  Never done   • Breast Cancer Screening: Mammogram  09/06/2023   • Colorectal Cancer Screening  11/30/2029         Topic Date Due   • COVID-19 Vaccine (6 - Booster) 11/17/2022      Medicare Screening Tests and Risk Assessments:     Albertine Mad is here for her Subsequent Wellness visit       Health Risk Assessment:   Patient rates overall health as good  Patient feels that their physical health rating is same  Patient is satisfied with their life  Eyesight was rated as slightly worse  Hearing was rated as slightly worse  Patient feels that their emotional and mental health rating is same  Patients states they are never, rarely angry  Patient states they are often unusually tired/fatigued  Pain experienced in the last 7 days has been some  Patient's pain rating has been 3/10  Patient states that she has experienced no weight loss or gain in last 6 months  Fall Risk Screening: In the past year, patient has experienced: no history of falling in past year      Urinary Incontinence Screening:   Patient has not leaked urine accidently in the last six months  Home Safety:  Patient does not have trouble with stairs inside or outside of their home  Patient has working smoke alarms and has no working carbon monoxide detector  Home safety hazards include: none  Nutrition:   Current diet is Regular  Medications:   Patient is currently taking over-the-counter supplements  OTC medications include: see medication list  Patient is able to manage medications  Activities of Daily Living (ADLs)/Instrumental Activities of Daily Living (IADLs):   Walk and transfer into and out of bed and chair?: Yes  Dress and groom yourself?: Yes    Bathe or shower yourself?: Yes    Feed yourself?  Yes  Do your laundry/housekeeping?: Yes  Manage your money, pay your bills and track your expenses?: Yes  Make your own meals?: Yes    Do your own shopping?: Yes    Previous Hospitalizations:   Any hospitalizations or ED visits within the last 12 months?: No      Advance Care Planning:   Living will: Yes    Advanced directive: Yes    Five wishes given: Yes      PREVENTIVE SCREENINGS      Cardiovascular Screening:    General: Screening Not Indicated and History Lipid Disorder      Diabetes Screening:     General: Screening Current      Colorectal Cancer Screening:     General: "Screening Current      Breast Cancer Screening:     General: Screening Current      Cervical Cancer Screening:    General: Screening Not Indicated      Lung Cancer Screening:     General: Screening Not Indicated    Screening, Brief Intervention, and Referral to Treatment (SBIRT)    Screening  Typical number of drinks in a day: 0  Typical number of drinks in a week: 0  Interpretation: Low risk drinking behavior  No results found  Physical Exam:     /80 (BP Location: Left arm, Patient Position: Sitting, Cuff Size: Standard)   Pulse 71   Temp 97 6 °F (36 4 °C) (Temporal)   Ht 5' 0 2\" (1 529 m)   Wt 61 7 kg (136 lb)   SpO2 98%   BMI 26 39 kg/m²     Physical Exam     Gen: NAD  Heent: atraumatic, normocephalic  Mouth: is moist  Neck: is supple, no JVD, no carotid bruits     Heart: is regular Normal s1, s2,  Lungs: are clear to auscultation  Abd: soft, non tender, NABS  Ext: no edema, distal pulses normal  Skin: varicose veins lower extremities  Mood: normal affect  Neuro: AAOx3  Dee Lackey MD  "

## 2023-05-31 DIAGNOSIS — Z13.820 SCREENING FOR OSTEOPOROSIS: ICD-10-CM

## 2023-05-31 RX ORDER — ALENDRONATE SODIUM 70 MG/1
70 TABLET ORAL
Qty: 12 TABLET | Refills: 1 | Status: SHIPPED | OUTPATIENT
Start: 2023-05-31

## 2023-06-15 DIAGNOSIS — I10 ESSENTIAL HYPERTENSION: ICD-10-CM

## 2023-06-15 RX ORDER — AMLODIPINE BESYLATE 5 MG/1
5 TABLET ORAL DAILY
Qty: 90 TABLET | Refills: 1 | Status: SHIPPED | OUTPATIENT
Start: 2023-06-15

## 2023-06-21 ENCOUNTER — OFFICE VISIT (OUTPATIENT)
Dept: INTERNAL MEDICINE CLINIC | Age: 78
End: 2023-06-21
Payer: MEDICARE

## 2023-06-21 VITALS
HEART RATE: 70 BPM | BODY MASS INDEX: 25.87 KG/M2 | TEMPERATURE: 98.6 F | OXYGEN SATURATION: 98 % | WEIGHT: 131.8 LBS | DIASTOLIC BLOOD PRESSURE: 88 MMHG | HEIGHT: 60 IN | SYSTOLIC BLOOD PRESSURE: 146 MMHG

## 2023-06-21 DIAGNOSIS — F41.1 GAD (GENERALIZED ANXIETY DISORDER): Primary | ICD-10-CM

## 2023-06-21 DIAGNOSIS — G47.09 OTHER INSOMNIA: ICD-10-CM

## 2023-06-21 DIAGNOSIS — I10 ESSENTIAL HYPERTENSION: ICD-10-CM

## 2023-06-21 PROCEDURE — 99213 OFFICE O/P EST LOW 20 MIN: CPT | Performed by: PHYSICIAN ASSISTANT

## 2023-06-21 RX ORDER — MIRTAZAPINE 7.5 MG/1
7.5 TABLET, FILM COATED ORAL
Qty: 30 TABLET | Refills: 3 | Status: SHIPPED | OUTPATIENT
Start: 2023-06-21

## 2023-06-21 RX ORDER — GLIMEPIRIDE 2 MG/1
TABLET ORAL
COMMUNITY
Start: 2023-06-14

## 2023-06-21 NOTE — PROGRESS NOTES
Assessment/Plan:         Diagnoses and all orders for this visit:    CALVIN (generalized anxiety disorder)  Comments:  trial mirtazapine 7 5mg at bedtime  avoid caffeine   discussed meditation and deep breathing   Orders:  -     mirtazapine (REMERON) 7 5 MG tablet; Take 1 tablet (7 5 mg total) by mouth daily at bedtime    Essential hypertension    Other insomnia  Comments:  discussed sleep habits, avoid napping and caffeine  trial mirtazpaine  f/u in 2-4 weeks  report any SE   Orders:  -     mirtazapine (REMERON) 7 5 MG tablet; Take 1 tablet (7 5 mg total) by mouth daily at bedtime    Other orders  -     timolol (TIMOPTIC) 0 25 % ophthalmic solution; instill 1 drop into right eye twice a day        Pt will f/u in office 3-4 weeks or sooner if any side effects noted  Pt will start low dose mirtazapine   Has not been on medication in past  Discussed seeking a counselor, pt wishes to hold off on this at this time     Subjective:      Patient ID: Kalpesh Zhang is a 68 y o  female      67 y/o female with hx of htn, hypothyroidism, RA  Pt reports increasing anxiety over husbands medical issues over the past 1 month or so  She also worries about her house and what would happen if something happens with her husbands illness  Pt states sometimes she feels a little panicky but denies panic attacks  She also worries about her kids who live out of state  Denies any life changing events occurring recently   She has difficulty sleeping lately - states she hasn't ever slept 8 hours but over the past few weeks she has been having more trouble with it  Pt denies feeling tearful or crying or depressed     She notices her appetite is decreased but she does eat 2-3 meals / day  Pt reports she fluctuates between 130-140s and has been stable       The following portions of the patient's history were reviewed and updated as appropriate: allergies, current medications, past family history, past medical history, past social history, past surgical history and problem list     Review of Systems   Constitutional: Positive for fatigue  Negative for activity change, appetite change, chills and fever  Gastrointestinal: Negative for abdominal pain  Musculoskeletal: Negative for arthralgias and back pain  Skin: Negative for rash  Neurological: Negative for dizziness, light-headedness and headaches  Psychiatric/Behavioral: Positive for dysphoric mood and sleep disturbance  The patient is nervous/anxious            Past Medical History:   Diagnosis Date   • Cataract    • Disease of thyroid gland     hypothyrodism    • Hypertension    • Lumbago    • Pure hypercholesterolemia    • Rheumatoid arthritis ([facility] [address] )    • Stenosis of rectum and anus          Current Outpatient Medications:   •  alendronate (FOSAMAX) 70 mg tablet, Take 1 tablet (70 mg total) by mouth weekly before breakfast, Disp: 12 tablet, Rfl: 1  •  amLODIPine (NORVASC) 5 mg tablet, Take 1 tablet (5 mg total) by mouth daily, Disp: 90 tablet, Rfl: 1  •  Cholecalciferol (Vitamin D-3) 25 MCG (1000 UT) CAPS, Take by mouth in the morning, Disp: , Rfl:   •  folic acid (FOLVITE) 1 mg tablet, Take 800 mcg by mouth 4 (four) times a week , Disp: , Rfl:   •  levothyroxine 88 mcg tablet, Take 1 tablet (88 mcg total) by mouth daily, Disp: 90 tablet, Rfl: 1  •  lisinopril (ZESTRIL) 20 mg tablet, Take 1 tablet (20 mg total) by mouth daily, Disp: 90 tablet, Rfl: 1  •  lovastatin (MEVACOR) 20 mg tablet, Take 1 tablet (20 mg total) by mouth daily at bedtime, Disp: 90 tablet, Rfl: 1  •  methotrexate 2 5 mg tablet, Take 15 mg by mouth once a week , Disp: , Rfl:   •  mirtazapine (REMERON) 7 5 MG tablet, Take 1 tablet (7 5 mg total) by mouth daily at bedtime, Disp: 30 tablet, Rfl: 3  •  Multiple Minerals-Vitamins (CITRACAL PLUS PO), Take by mouth 200 mg daily, Disp: , Rfl:   •  multivitamin (THERAGRAN) TABS, Take 1 tablet by mouth 4 (four) times a week, Disp: , Rfl:   •  timolol (TIMOPTIC) 0 25 % ophthalmic "solution, instill 1 drop into right eye twice a day, Disp: , Rfl:     Allergies   Allergen Reactions   • Penicillins Hives       Social History   Past Surgical History:   Procedure Laterality Date   • CATARACT EXTRACTION Bilateral    • CERVIX SURGERY     • HEMORROIDECTOMY     • MAMMO (HISTORICAL)  2020 [facility]   • MENISCECTOMY     • [place] COLONOSCOPY FLX DX W/COLLJ SPEC WHEN PFRMD N/A 11/17/2017    Procedure: COLONOSCOPY;  Surgeon: Curt Up MD;  Location: [facility]; Service: Colorectal     Family History   Problem Relation Age of Onset   • Breast cancer Mother 47   • No Known Problems Father    • Prostate cancer Brother 71   • Breast cancer Maternal Aunt 60   • Prostate cancer Paternal Uncle [de-identified]   • Endometrial cancer Cousin 62   • No Known Problems Daughter    • No Known Problems Maternal Grandmother    • No Known Problems Maternal Grandfather    • No Known Problems Paternal Grandmother    • No Known Problems Paternal Grandfather    • No Known Problems Daughter        Objective:  /88 (BP Location: Left arm, Patient Position: Sitting, Cuff Size: Standard)   Pulse 70   Temp 98 6 °F (37 °C) (Temporal)   Ht 5' 0 2\" (1 529 m)   Wt 59 8 kg (131 lb 12 8 oz)   SpO2 98%   BMI 25 57 kg/m²        Physical Exam  Vitals reviewed  Constitutional:       General: She is not in acute distress  HENT:      Head: Normocephalic and atraumatic  Nose: Nose normal    Eyes:      General:         Right eye: No discharge  Left eye: No discharge  Extraocular Movements: Extraocular movements intact  Conjunctiva/sclera: Conjunctivae normal       Pupils: Pupils are equal, round, and reactive to light  Cardiovascular:      Rate and Rhythm: Normal rate and regular rhythm  Pulmonary:      Effort: Pulmonary effort is normal  No respiratory distress  Breath sounds: Normal breath sounds  No wheezing, rhonchi or rales  Musculoskeletal:      Cervical back: Normal range of motion        Right lower " leg: No edema  Left lower leg: No edema  Skin:     General: Skin is warm  Findings: No erythema or rash  Neurological:      General: No focal deficit present  Mental Status: She is alert and oriented to person, place, and time     Psychiatric:         Mood and Affect: Mood normal          Behavior: Behavior normal

## 2023-07-26 ENCOUNTER — OFFICE VISIT (OUTPATIENT)
Dept: INTERNAL MEDICINE CLINIC | Age: 78
End: 2023-07-26
Payer: MEDICARE

## 2023-07-26 VITALS
HEART RATE: 80 BPM | SYSTOLIC BLOOD PRESSURE: 132 MMHG | DIASTOLIC BLOOD PRESSURE: 72 MMHG | BODY MASS INDEX: 25.91 KG/M2 | TEMPERATURE: 98.5 F | OXYGEN SATURATION: 97 % | WEIGHT: 132 LBS | HEIGHT: 60 IN

## 2023-07-26 DIAGNOSIS — F41.1 GAD (GENERALIZED ANXIETY DISORDER): Primary | ICD-10-CM

## 2023-07-26 DIAGNOSIS — E78.2 MIXED HYPERLIPIDEMIA: ICD-10-CM

## 2023-07-26 DIAGNOSIS — I10 ESSENTIAL HYPERTENSION: ICD-10-CM

## 2023-07-26 PROCEDURE — 99213 OFFICE O/P EST LOW 20 MIN: CPT | Performed by: PHYSICIAN ASSISTANT

## 2023-07-26 NOTE — PROGRESS NOTES
Assessment/Plan:         Diagnoses and all orders for this visit:    CALVIN (generalized anxiety disorder)  Comments:  continue mirtazpine qhs   f/u in 2-3 months, sooner if sx recur   discussed therapy - pt wishes to hold off on this for now      Essential hypertension  Comments:  well controlled    Mixed hyperlipidemia  Comments:  continue lovastatin        F/u with PCP in November or sooner if sx return   Subjective:      Patient ID: Josephine Vega is a 68 y.o. female. 67 y/o female with hx of Calvin  Presents for f/u after starting mirtazpine 1 month ago  Pt reports she is feeling much better on this medication and seems to have improved her mood     Pt reports her panic feelings have been better and she denies panic attacks   Sleep and appetite is improved   pt would like to continue at current dose, discussed couandleing again with pt and she wishes to hold off on this       The following portions of the patient's history were reviewed and updated as appropriate: allergies, current medications, past family history, past medical history, past social history, past surgical history and problem list.    Review of Systems   Constitutional: Negative for activity change, appetite change, chills, diaphoresis, fatigue and fever. HENT: Negative for congestion, sore throat and trouble swallowing. Eyes: Negative for pain and redness. Respiratory: Negative for cough and shortness of breath. Cardiovascular: Negative for chest pain and leg swelling. Gastrointestinal: Negative for abdominal pain, constipation, diarrhea and nausea. Genitourinary: Negative for dysuria and flank pain. Musculoskeletal: Negative for arthralgias, back pain and gait problem. Skin: Negative for rash. Neurological: Negative for dizziness, light-headedness and headaches. Psychiatric/Behavioral: Negative for dysphoric mood and sleep disturbance. The patient is not nervous/anxious.           Past Medical History:   Diagnosis Date   • Allergic 1952   • Arthritis 2007   • Cataract    • Disease of thyroid gland     hypothyrodism    • Hypertension    • Lumbago    • Pure hypercholesterolemia    • Rheumatoid arthritis ([address])    • Stenosis of rectum and anus          Current Outpatient Medications:   •  alendronate (FOSAMAX) 70 mg tablet, Take 1 tablet (70 mg total) by mouth weekly before breakfast, Disp: 12 tablet, Rfl: 1  •  amLODIPine (NORVASC) 5 mg tablet, Take 1 tablet (5 mg total) by mouth daily, Disp: 90 tablet, Rfl: 1  •  Cholecalciferol (Vitamin D-3) 25 MCG (1000 UT) CAPS, Take by mouth in the morning, Disp: , Rfl:   •  folic acid (FOLVITE) 1 mg tablet, Take 800 mcg by mouth 4 (four) times a week , Disp: , Rfl:   •  levothyroxine 88 mcg tablet, Take 1 tablet (88 mcg total) by mouth daily, Disp: 90 tablet, Rfl: 1  •  lisinopril (ZESTRIL) 20 mg tablet, Take 1 tablet (20 mg total) by mouth daily, Disp: 90 tablet, Rfl: 1  •  lovastatin (MEVACOR) 20 mg tablet, Take 1 tablet (20 mg total) by mouth daily at bedtime, Disp: 90 tablet, Rfl: 1  •  methotrexate 2.5 mg tablet, Take 15 mg by mouth once a week , Disp: , Rfl:   •  mirtazapine (REMERON) 7.5 MG tablet, Take 1 tablet (7.5 mg total) by mouth daily at bedtime, Disp: 30 tablet, Rfl: 3  •  Multiple Minerals-Vitamins (CITRACAL PLUS PO), Take by mouth 200 mg daily, Disp: , Rfl:   •  multivitamin (THERAGRAN) TABS, Take 1 tablet by mouth 4 (four) times a week, Disp: , Rfl:   •  timolol (TIMOPTIC) 0.25 % ophthalmic solution, instill 1 drop into right eye twice a day, Disp: , Rfl:     Allergies   Allergen Reactions   • Penicillins Hives       Social History   Past Surgical History:   Procedure Laterality Date   • CATARACT EXTRACTION Bilateral    • CERVIX SURGERY     • HEMORROIDECTOMY     • MAMMO (HISTORICAL)  2020 [facility]   • MENISCECTOMY     • [place] COLONOSCOPY FLX DX W/COLLJ SPEC WHEN PFRMD N/A 11/17/2017    Procedure: COLONOSCOPY;  Surgeon: Collins Morales MD;  Location: [facility];   Service: Colorectal     Family History   Problem Relation Age of Onset   • Breast cancer Mother    • Thyroid disease Mother    • Hearing loss Father    • Prostate cancer Brother    • Breast cancer Maternal Aunt    • Prostate cancer Paternal Uncle    • Endometrial cancer Cousin 62   • No Known Problems Daughter    • No Known Problems Maternal Grandmother    • No Known Problems Maternal Grandfather    • No Known Problems Paternal Grandmother    • No Known Problems Paternal Grandfather    • No Known Problems Daughter        Objective:  /72 (BP Location: Left arm, Patient Position: Sitting, Cuff Size: Standard)   Pulse 80   Temp 98.5 °F (36.9 °C) (Temporal)   Ht 5' 0.2" (1.529 m)   Wt 59.9 kg (132 lb)   SpO2 97%   BMI 25.61 kg/m²        Physical Exam  Vitals reviewed. Constitutional:       General: She is not in acute distress. Appearance: She is not toxic-appearing. HENT:      Head: Normocephalic and atraumatic. Nose: Nose normal.   Eyes:      General:         Right eye: No discharge. Left eye: No discharge. Extraocular Movements: Extraocular movements intact. Conjunctiva/sclera: Conjunctivae normal.      Pupils: Pupils are equal, round, and reactive to light. Cardiovascular:      Rate and Rhythm: Normal rate and regular rhythm. Pulmonary:      Effort: Pulmonary effort is normal. No respiratory distress. Breath sounds: Normal breath sounds. No wheezing or rales. Musculoskeletal:      Cervical back: Normal range of motion. Right lower leg: No edema. Left lower leg: No edema. Skin:     Findings: No erythema or rash. Neurological:      General: No focal deficit present. Mental Status: She is alert and oriented to person, place, and time. Cranial Nerves: No cranial nerve deficit.    Psychiatric:         Mood and Affect: Mood normal.         Behavior: Behavior normal.

## 2023-08-01 ENCOUNTER — TELEPHONE (OUTPATIENT)
Dept: INTERNAL MEDICINE CLINIC | Facility: CLINIC | Age: 78
End: 2023-08-01

## 2023-08-01 DIAGNOSIS — E03.9 HYPOTHYROIDISM, UNSPECIFIED TYPE: ICD-10-CM

## 2023-08-01 RX ORDER — LEVOTHYROXINE SODIUM 88 UG/1
88 TABLET ORAL DAILY
Qty: 90 TABLET | Refills: 1 | Status: SHIPPED | OUTPATIENT
Start: 2023-08-01

## 2023-08-01 NOTE — TELEPHONE ENCOUNTER
rx refill for levothyroxine 88 mcg tablet.     Send to [address], [address]     Nov- 11/21/2023

## 2023-09-06 ENCOUNTER — ANNUAL EXAM (OUTPATIENT)
Age: 78
End: 2023-09-06
Payer: MEDICARE

## 2023-09-06 VITALS
BODY MASS INDEX: 26.65 KG/M2 | DIASTOLIC BLOOD PRESSURE: 80 MMHG | WEIGHT: 132.2 LBS | HEIGHT: 59 IN | SYSTOLIC BLOOD PRESSURE: 144 MMHG

## 2023-09-06 DIAGNOSIS — Z01.419 ENCOUNTER FOR GYNECOLOGICAL EXAMINATION (GENERAL) (ROUTINE) WITHOUT ABNORMAL FINDINGS: Primary | ICD-10-CM

## 2023-09-06 PROBLEM — Z01.818 PREOPERATIVE CLEARANCE: Status: RESOLVED | Noted: 2021-10-18 | Resolved: 2023-09-06

## 2023-09-06 PROCEDURE — G0101 CA SCREEN;PELVIC/BREAST EXAM: HCPCS | Performed by: OBSTETRICS & GYNECOLOGY

## 2023-09-06 NOTE — PROGRESS NOTES
Assessment/Plan:     Diagnoses and all orders for this visit:    Encounter for gynecological examination (general) (routine) without abnormal findings             Subjective      Yue Zacarias is a 68 y.o. female who presents for annual exam. The patient has no complaints today. The patient is not sexually active. The patient is not taking hormone replacement therapy. Patient denies post-menopausal vaginal bleeding. She denies any breast or urinary concerns. Mammo: 22 BiRads: 2, Benign (scheduled)   Dexa: 21 Osteopenia (seeing Dr. Nerissa Powell)   Colon: 22 (7 yr recall)      Menstrual History:  OB History        2    Para   2    Term   2            AB        Living           SAB        IAB        Ectopic        Multiple        Live Births                    No LMP recorded. Patient is postmenopausal.     Past Medical History:   Diagnosis Date   • Allergic    • Arthritis    • Cataract    • Disease of thyroid gland     hypothyrodism    • Hypertension    • Lumbago    • Pure hypercholesterolemia    • Rheumatoid arthritis ([address])    • Stenosis of rectum and anus        Family History   Problem Relation Age of Onset   • Breast cancer Mother    • Thyroid disease Mother    • Hearing loss Father    • Prostate cancer Brother    • Breast cancer Maternal Aunt    • Prostate cancer Paternal Uncle    • Endometrial cancer Cousin 62   • No Known Problems Daughter    • No Known Problems Maternal Grandmother    • No Known Problems Maternal Grandfather    • No Known Problems Paternal Grandmother    • No Known Problems Paternal Grandfather    • No Known Problems Daughter        The following portions of the patient's history were reviewed and updated as appropriate: allergies, current medications, past family history, past medical history, past social history, past surgical history and problem list.    Review of Systems  Pertinent items are noted in HPI.      Objective      /80 (BP Location: Right arm, Patient Position: Sitting, Cuff Size: Standard)   Ht 4' 11" (1.499 m)   Wt 60 kg (132 lb 3.2 oz)   BMI 26.70 kg/m²     General:   alert and oriented, in no acute distress   Heart:  Breasts: regular rate and rhythm   appear normal, no suspicious masses, no skin or nipple changes or axillary nodes. Lungs: effort normal   Abdomen: soft, non-tender, without masses or organomegaly   Vulva: normal   Vagina:  Atrophic, no lesions   Cervix: no lesions   Uterus: normal size, mobile, non-tender   Adnexa: normal adnexa and no mass, fullness, tenderness

## 2023-09-08 ENCOUNTER — HOSPITAL ENCOUNTER (OUTPATIENT)
Dept: RADIOLOGY | Age: 78
Discharge: HOME | End: 2023-09-08
Payer: MEDICARE

## 2023-09-08 DIAGNOSIS — Z12.31 ENCOUNTER FOR SCREENING MAMMOGRAM FOR MALIGNANT NEOPLASM OF BREAST: ICD-10-CM

## 2023-09-08 PROCEDURE — 77067 SCR MAMMO BI INCL CAD: CPT

## 2023-09-08 PROCEDURE — 77063 BREAST TOMOSYNTHESIS BI: CPT

## 2023-09-14 DIAGNOSIS — I10 ESSENTIAL HYPERTENSION: ICD-10-CM

## 2023-09-14 RX ORDER — LISINOPRIL 20 MG/1
20 TABLET ORAL DAILY
Qty: 90 TABLET | Refills: 1 | Status: SHIPPED | OUTPATIENT
Start: 2023-09-14

## 2023-10-04 ENCOUNTER — HOSPITAL ENCOUNTER (OUTPATIENT)
Dept: RADIOLOGY | Age: 78
Discharge: HOME | End: 2023-10-04

## 2023-10-04 DIAGNOSIS — M06.09 RHEUMATOID ARTHRITIS OF MULTIPLE SITES WITH NEGATIVE RHEUMATOID FACTOR (HCC): ICD-10-CM

## 2023-10-04 DIAGNOSIS — E03.4 HYPOTHYROIDISM DUE TO ACQUIRED ATROPHY OF THYROID: ICD-10-CM

## 2023-10-04 DIAGNOSIS — M85.88 OTHER SPECIFIED DISORDERS OF BONE DENSITY AND STRUCTURE, OTHER SITE: ICD-10-CM

## 2023-10-06 ENCOUNTER — HOSPITAL ENCOUNTER (OUTPATIENT)
Dept: RADIOLOGY | Age: 78
Discharge: HOME | End: 2023-10-06
Payer: MEDICARE

## 2023-10-06 PROCEDURE — 77080 DXA BONE DENSITY AXIAL: CPT

## 2023-10-13 DIAGNOSIS — F41.1 GAD (GENERALIZED ANXIETY DISORDER): ICD-10-CM

## 2023-10-13 DIAGNOSIS — G47.09 OTHER INSOMNIA: ICD-10-CM

## 2023-10-13 RX ORDER — MIRTAZAPINE 7.5 MG/1
7.5 TABLET, FILM COATED ORAL
Qty: 30 TABLET | Refills: 3 | Status: SHIPPED | OUTPATIENT
Start: 2023-10-13

## 2023-10-26 DIAGNOSIS — E78.2 MIXED HYPERLIPIDEMIA: ICD-10-CM

## 2023-10-26 RX ORDER — LOVASTATIN 20 MG/1
20 TABLET ORAL
Qty: 90 TABLET | Refills: 1 | Status: SHIPPED | OUTPATIENT
Start: 2023-10-26

## 2023-11-09 LAB
CHOLEST SERPL-MCNC: 201 MG/DL
CHOLEST/HDLC SERPL: 2.8 (CALC)
HDLC SERPL-MCNC: 71 MG/DL
LDLC SERPL CALC-MCNC: 111 MG/DL (CALC)
NONHDLC SERPL-MCNC: 130 MG/DL (CALC)
TRIGL SERPL-MCNC: 89 MG/DL

## 2023-11-13 DIAGNOSIS — Z13.820 SCREENING FOR OSTEOPOROSIS: ICD-10-CM

## 2023-11-13 RX ORDER — ALENDRONATE SODIUM 70 MG/1
70 TABLET ORAL
Qty: 12 TABLET | Refills: 1 | Status: SHIPPED | OUTPATIENT
Start: 2023-11-13

## 2023-11-14 ENCOUNTER — RA CDI HCC (OUTPATIENT)
Dept: OTHER | Facility: HOSPITAL | Age: 78
End: 2023-11-14

## 2023-11-21 ENCOUNTER — OFFICE VISIT (OUTPATIENT)
Age: 78
End: 2023-11-21
Payer: MEDICARE

## 2023-11-21 VITALS
HEIGHT: 60 IN | TEMPERATURE: 97.7 F | BODY MASS INDEX: 25.84 KG/M2 | DIASTOLIC BLOOD PRESSURE: 70 MMHG | OXYGEN SATURATION: 98 % | WEIGHT: 131.6 LBS | SYSTOLIC BLOOD PRESSURE: 150 MMHG | HEART RATE: 71 BPM

## 2023-11-21 DIAGNOSIS — R73.9 HYPERGLYCEMIA: ICD-10-CM

## 2023-11-21 DIAGNOSIS — E78.2 MIXED HYPERLIPIDEMIA: ICD-10-CM

## 2023-11-21 DIAGNOSIS — I10 ESSENTIAL HYPERTENSION: Primary | ICD-10-CM

## 2023-11-21 DIAGNOSIS — E03.4 HYPOTHYROIDISM DUE TO ACQUIRED ATROPHY OF THYROID: ICD-10-CM

## 2023-11-21 DIAGNOSIS — I83.893 VARICOSE VEINS OF BILATERAL LOWER EXTREMITIES WITH OTHER COMPLICATIONS: ICD-10-CM

## 2023-11-21 DIAGNOSIS — M06.09 RHEUMATOID ARTHRITIS OF MULTIPLE SITES WITH NEGATIVE RHEUMATOID FACTOR (HCC): ICD-10-CM

## 2023-11-21 PROCEDURE — 99214 OFFICE O/P EST MOD 30 MIN: CPT | Performed by: INTERNAL MEDICINE

## 2023-11-21 NOTE — PROGRESS NOTES
Name: Neida Teixeira      : 1945      MRN: [id number]  Encounter Provider: Amari Siddiqui MD  Encounter Date: 2023   Encounter department: [facility]     Reviewed blood pressure readings however these were elevated today because of difficulty in finding our office today. We have relocated and patient was not exactly sure where the new office was located. Overall blood pressure readings at home seem to be within normal limits. Systolic ones seem to be somewhere between 130-135 at home. Diastolics are usually within normal limits. She was also seen by GYN recently for her annual examination. Review of notes shows that the examination was within normal limits. There is a history of gynecologic cancers in the family with history of breast cancer in mother, maternal aunt and uterine cancer in a maternal cousin. Will discuss with GYN if any further intervention is needed at this time. Recent examination was within normal limits. Cholesterol readings were mildly elevated. Patient is reluctant to increase her statin dosage at this time. She would like to recheck it in a few months to see if she can modify her diet a little better. Also counseled about mild kyphosis that was present on examination and exercises for posture improvement. Continue vitamin D supplementation. Reviewed DEXA scan which shows osteopenia  1. Essential hypertension  Assessment & Plan:  Adequately controlled . Managing lifestyle and meds well. Continue present management    Orders:  -     Comprehensive metabolic panel; Future  -     Lipid Panel with Direct LDL reflex; Future  -     TSH, 3rd generation with Free T4 reflex; Future  -     CBC and differential; Future    2. Varicose veins of bilateral lower extremities with other complications  Assessment & Plan:  Leg elevation and compression wear recommended.       3. Mixed hyperlipidemia  Assessment & Plan:  High ASCVD score. Discussed risk factor modification including ideal LDLc and A1C      4. Hypothyroidism due to acquired atrophy of thyroid  Assessment & Plan:  Continued supplementation. Monitor levels       5. Rheumatoid arthritis of multiple sites with negative rheumatoid factor ([address])  Assessment & Plan:  Symptoms are fairly well controlled      6. Hyperglycemia  -     Hemoglobin A1C; Future        Depression Screening and Follow-up Plan: Patient was screened for depression during today's encounter. They screened negative with a PHQ-2 score of 0. Subjective     Chief Complaint   Patient presents with   • Follow-up     Review labs done 11/9   • Health Screening     Depression screen mammogram schedule for 9/13/2024      HPI    65 yo lady with hx of HTN, HLD, hypothyroidism, sero negative RA and B/L lower extremity varicose veins for F/U. Has tried compression stockings a few times times but does not use it on a regular basis. They do not seem to bother her as much at the present time. She has a personal Hx of dense breasts and hx of breast CA in mother and maternal aunt and hx of endometrial CA in her cousin. Here today for 6 mo follow up. Does have regular gyn visits and also rheumatology and GYN    Review of Systems   Cardiovascular:  Positive for leg swelling. Musculoskeletal:  Positive for arthralgias. Skin:         Venous stasis lower extremity   All other systems reviewed and are negative.       Past Medical History:   Diagnosis Date   • Allergic 1952   • Arthritis 2007   • Cataract    • Disease of thyroid gland     hypothyrodism    • Hypertension    • Lumbago    • Pure hypercholesterolemia    • Rheumatoid arthritis ([address])    • Stenosis of rectum and anus      Past Surgical History:   Procedure Laterality Date   • CATARACT EXTRACTION Bilateral    • CERVIX SURGERY     • HEMORROIDECTOMY     • MAMMO (HISTORICAL)  2020    [facility]   • MENISCECTOMY     • [place] COLONOSCOPY FLX DX W/COLLJ SPEC WHEN PFRMD N/A 11/17/2017    Procedure: COLONOSCOPY;  Surgeon: Clementina Werner MD;  Location: [facility]; Service: Colorectal     Family History   Problem Relation Age of Onset   • Breast cancer Mother    • Thyroid disease Mother    • Hearing loss Father    • Prostate cancer Brother    • Breast cancer Maternal Aunt    • Prostate cancer Paternal Uncle    • Endometrial cancer Cousin 62   • No Known Problems Daughter    • No Known Problems Maternal Grandmother    • No Known Problems Maternal Grandfather    • No Known Problems Paternal Grandmother    • No Known Problems Paternal Grandfather    • No Known Problems Daughter      Social History     Socioeconomic History   • Marital status: /Civil Union     Spouse name: None   • Number of children: None   • Years of education: None   • Highest education level: None   Occupational History   • None   Tobacco Use   • Smoking status: Never   • Smokeless tobacco: Never   Vaping Use   • Vaping Use: Never used   Substance and Sexual Activity   • Alcohol use: Not Currently     Comment: 1 drink new years oren   • Drug use: No   • Sexual activity: Not Currently   Other Topics Concern   • None   Social History Narrative   • None     Social Determinants of Health     Financial Resource Strain: Low Risk  (5/17/2023)    Overall Financial Resource Strain (CARDIA)    • Difficulty of Paying Living Expenses: Not hard at all   Food Insecurity: Not on file   Transportation Needs: No Transportation Needs (5/17/2023)    PRAPARE - Transportation    • Lack of Transportation (Medical): No    • Lack of Transportation (Non-Medical):  No   Physical Activity: Not on file   Stress: Not on file   Social Connections: Not on file   Intimate Partner Violence: Not on file   Housing Stability: Not on file     Current Outpatient Medications on File Prior to Visit   Medication Sig   • alendronate (FOSAMAX) 70 mg tablet Take 1 tablet (70 mg total) by mouth weekly before breakfast   • amLODIPine (NORVASC) 5 mg tablet Take 1 tablet (5 mg total) by mouth daily   • Cholecalciferol (Vitamin D-3) 25 MCG (1000 UT) CAPS Take by mouth in the morning   • folic acid (FOLVITE) 1 mg tablet Take 800 mcg by mouth 4 (four) times a week    • levothyroxine 88 mcg tablet Take 1 tablet (88 mcg total) by mouth daily   • lisinopril (ZESTRIL) 20 mg tablet Take 1 tablet (20 mg total) by mouth daily   • lovastatin (MEVACOR) 20 mg tablet Take 1 tablet (20 mg total) by mouth daily at bedtime   • methotrexate 2.5 mg tablet Take 15 mg by mouth once a week    • mirtazapine (REMERON) 7.5 MG tablet Take 1 tablet (7.5 mg total) by mouth daily at bedtime   • Multiple Minerals-Vitamins (CITRACAL PLUS PO) Take by mouth 200 mg daily   • multivitamin (THERAGRAN) TABS Take 1 tablet by mouth 4 (four) times a week   • timolol (TIMOPTIC) 0.25 % ophthalmic solution instill 1 drop into right eye twice a day     Allergies   Allergen Reactions   • Penicillins Hives     Immunization History   Administered Date(s) Administered   • COVID-19 MODERNA VACC 0.5 ML IM 01/23/2021, 01/23/2021, 02/22/2021, 02/22/2021, 09/22/2022   • COVID-19 Moderna mRNA Vaccine 12 Yr+ 50 mcg/0.5 mL (Spikevax) 10/05/2023   • INFLUENZA 10/29/2018, 10/29/2018, 10/23/2019, 10/23/2019, 08/25/2020, 08/25/2020, 10/18/2021   • Influenza, high dose seasonal 0.7 mL 10/18/2021, 10/07/2022, 10/19/2023   • MMR 10/17/2017, 10/17/2017   • Pneumococcal Conjugate 13-Valent 03/30/2016, 03/30/2016   • Pneumococcal Polysaccharide PPV23 12/09/2013, 12/09/2013   • Tdap 10/17/2016, 10/17/2016   • Zoster 07/16/2015, 07/16/2015   • Zoster Vaccine Recombinant 02/05/2019, 06/09/2019       Objective     /70 (BP Location: Left arm, Patient Position: Sitting, Cuff Size: Standard)   Pulse 71   Temp 97.7 °F (36.5 °C) (Temporal)   Ht 5' 0.35" (1.533 m)   Wt 59.7 kg (131 lb 9.6 oz)   SpO2 98%   BMI 25.40 kg/m²     Physical Exam    Gen: NAD  Heent: atraumatic, normocephalic  Mouth: is moist  Neck: is supple, no JVD, no carotid bruits.    Heart: is regular Normal s1, s2,  Back : Mild thoracic kyphosis  Lungs: are clear to auscultation  Abd: soft, non tender, NABS  Ext: no edema, distal pulses normal  Skin: no rashes, ulcers  Mood: normal affect  Neuro: AAOx3     Luzmaria Corona MD

## 2023-12-13 DIAGNOSIS — I10 ESSENTIAL HYPERTENSION: ICD-10-CM

## 2023-12-13 RX ORDER — AMLODIPINE BESYLATE 5 MG/1
5 TABLET ORAL DAILY
Qty: 90 TABLET | Refills: 1 | Status: SHIPPED | OUTPATIENT
Start: 2023-12-13

## 2024-01-23 DIAGNOSIS — E03.9 HYPOTHYROIDISM, UNSPECIFIED TYPE: ICD-10-CM

## 2024-01-23 RX ORDER — LEVOTHYROXINE SODIUM 88 UG/1
88 TABLET ORAL DAILY
Qty: 90 TABLET | Refills: 1 | Status: SHIPPED | OUTPATIENT
Start: 2024-01-23

## 2024-02-12 DIAGNOSIS — G47.09 OTHER INSOMNIA: ICD-10-CM

## 2024-02-12 DIAGNOSIS — F41.1 GAD (GENERALIZED ANXIETY DISORDER): ICD-10-CM

## 2024-02-12 RX ORDER — MIRTAZAPINE 7.5 MG/1
7.5 TABLET, FILM COATED ORAL
Qty: 30 TABLET | Refills: 5 | Status: SHIPPED | OUTPATIENT
Start: 2024-02-12

## 2024-02-21 PROBLEM — Z13.820 SCREENING FOR OSTEOPOROSIS: Status: RESOLVED | Noted: 2019-08-05 | Resolved: 2024-02-21

## 2024-02-21 PROBLEM — Z01.419 ENCOUNTER FOR GYNECOLOGICAL EXAMINATION (GENERAL) (ROUTINE) WITHOUT ABNORMAL FINDINGS: Status: RESOLVED | Noted: 2019-08-05 | Resolved: 2024-02-21

## 2024-02-21 PROBLEM — Z12.39 SCREENING FOR MALIGNANT NEOPLASM OF BREAST: Status: RESOLVED | Noted: 2019-08-05 | Resolved: 2024-02-21

## 2024-03-26 DIAGNOSIS — I10 ESSENTIAL HYPERTENSION: ICD-10-CM

## 2024-03-26 RX ORDER — LISINOPRIL 20 MG/1
20 TABLET ORAL DAILY
Qty: 90 TABLET | Refills: 1 | Status: SHIPPED | OUTPATIENT
Start: 2024-03-26

## 2024-04-22 DIAGNOSIS — E78.2 MIXED HYPERLIPIDEMIA: ICD-10-CM

## 2024-04-22 RX ORDER — LOVASTATIN 20 MG/1
20 TABLET ORAL
Qty: 90 TABLET | Refills: 1 | Status: SHIPPED | OUTPATIENT
Start: 2024-04-22

## 2024-04-30 DIAGNOSIS — Z13.820 SCREENING FOR OSTEOPOROSIS: ICD-10-CM

## 2024-04-30 NOTE — TELEPHONE ENCOUNTER
Reason for call:   [x] Refill   [] Prior Auth  [] Other:     Office:   [x] PCP/Provider - Renetta Myrick MD   [] Specialty/Provider -     Medication: alendronate (FOSAMAX) 70 mg tablet     Dose/Frequency: Take 1 tablet (70 mg total) by mouth weekly before breakfast     Quantity: 12    Pharmacy: [id number] [place] [address] [phone]    Does the patient have enough for 3 days?   [x] Yes   [] No - Send as HP to POD

## 2024-05-02 RX ORDER — ALENDRONATE SODIUM 70 MG/1
70 TABLET ORAL
Qty: 12 TABLET | Refills: 1 | Status: SHIPPED | OUTPATIENT
Start: 2024-05-02

## 2024-05-11 LAB
ALBUMIN SERPL-MCNC: 4.4 G/DL (ref 3.6–5.1)
ALBUMIN/GLOB SERPL: 1.8 (CALC) (ref 1–2.5)
ALP SERPL-CCNC: 68 U/L (ref 37–153)
ALT SERPL-CCNC: 11 U/L (ref 6–29)
AST SERPL-CCNC: 20 U/L (ref 10–35)
BASOPHILS # BLD AUTO: 59 CELLS/UL (ref 0–200)
BASOPHILS NFR BLD AUTO: 1.1 %
BILIRUB SERPL-MCNC: 0.6 MG/DL (ref 0.2–1.2)
BUN SERPL-MCNC: 12 MG/DL (ref 7–25)
BUN/CREAT SERPL: NORMAL (CALC) (ref 6–22)
CALCIUM SERPL-MCNC: 8.8 MG/DL (ref 8.6–10.4)
CHLORIDE SERPL-SCNC: 104 MMOL/L (ref 98–110)
CHOLEST SERPL-MCNC: 191 MG/DL
CHOLEST/HDLC SERPL: 2.7 (CALC)
CO2 SERPL-SCNC: 27 MMOL/L (ref 20–32)
CREAT SERPL-MCNC: 0.72 MG/DL (ref 0.6–1)
EOSINOPHIL # BLD AUTO: 232 CELLS/UL (ref 15–500)
EOSINOPHIL NFR BLD AUTO: 4.3 %
ERYTHROCYTE [DISTWIDTH] IN BLOOD BY AUTOMATED COUNT: 15.8 % (ref 11–15)
GFR/BSA.PRED SERPLBLD CYS-BASED-ARV: 86 ML/MIN/1.73M2
GLOBULIN SER CALC-MCNC: 2.5 G/DL (CALC) (ref 1.9–3.7)
GLUCOSE SERPL-MCNC: 78 MG/DL (ref 65–99)
HBA1C MFR BLD: 6.1 % OF TOTAL HGB
HCT VFR BLD AUTO: 38.8 % (ref 35–45)
HDLC SERPL-MCNC: 71 MG/DL
HGB BLD-MCNC: 12.6 G/DL (ref 11.7–15.5)
LDLC SERPL CALC-MCNC: 106 MG/DL (CALC)
LYMPHOCYTES # BLD AUTO: 967 CELLS/UL (ref 850–3900)
LYMPHOCYTES NFR BLD AUTO: 17.9 %
MCH RBC QN AUTO: 28.1 PG (ref 27–33)
MCHC RBC AUTO-ENTMCNC: 32.5 G/DL (ref 32–36)
MCV RBC AUTO: 86.4 FL (ref 80–100)
MONOCYTES # BLD AUTO: 702 CELLS/UL (ref 200–950)
MONOCYTES NFR BLD AUTO: 13 %
NEUTROPHILS # BLD AUTO: 3440 CELLS/UL (ref 1500–7800)
NEUTROPHILS NFR BLD AUTO: 63.7 %
NONHDLC SERPL-MCNC: 120 MG/DL (CALC)
PLATELET # BLD AUTO: 258 THOUSAND/UL (ref 140–400)
PMV BLD REES-ECKER: 9.3 FL (ref 7.5–12.5)
POTASSIUM SERPL-SCNC: 3.8 MMOL/L (ref 3.5–5.3)
PROT SERPL-MCNC: 6.9 G/DL (ref 6.1–8.1)
RBC # BLD AUTO: 4.49 MILLION/UL (ref 3.8–5.1)
SODIUM SERPL-SCNC: 141 MMOL/L (ref 135–146)
TRIGL SERPL-MCNC: 58 MG/DL
TSH SERPL-ACNC: 2.16 MIU/L (ref 0.4–4.5)
WBC # BLD AUTO: 5.4 THOUSAND/UL (ref 3.8–10.8)

## 2024-05-22 ENCOUNTER — OFFICE VISIT (OUTPATIENT)
Age: 79
End: 2024-05-22
Payer: MEDICARE

## 2024-05-22 ENCOUNTER — TELEPHONE (OUTPATIENT)
Age: 79
End: 2024-05-22

## 2024-05-22 VITALS
TEMPERATURE: 98.3 F | SYSTOLIC BLOOD PRESSURE: 130 MMHG | OXYGEN SATURATION: 98 % | HEART RATE: 67 BPM | HEIGHT: 60 IN | BODY MASS INDEX: 26.35 KG/M2 | DIASTOLIC BLOOD PRESSURE: 68 MMHG | WEIGHT: 134.2 LBS

## 2024-05-22 DIAGNOSIS — Z00.00 MEDICARE ANNUAL WELLNESS VISIT, SUBSEQUENT: Primary | ICD-10-CM

## 2024-05-22 DIAGNOSIS — R73.03 PREDIABETES: ICD-10-CM

## 2024-05-22 DIAGNOSIS — I83.893 VARICOSE VEINS OF BILATERAL LOWER EXTREMITIES WITH OTHER COMPLICATIONS: ICD-10-CM

## 2024-05-22 DIAGNOSIS — I10 ESSENTIAL HYPERTENSION: ICD-10-CM

## 2024-05-22 DIAGNOSIS — M06.09 RHEUMATOID ARTHRITIS OF MULTIPLE SITES WITH NEGATIVE RHEUMATOID FACTOR (HCC): ICD-10-CM

## 2024-05-22 DIAGNOSIS — E03.9 HYPOTHYROIDISM, UNSPECIFIED TYPE: Primary | ICD-10-CM

## 2024-05-22 DIAGNOSIS — E78.2 MIXED HYPERLIPIDEMIA: ICD-10-CM

## 2024-05-22 PROBLEM — M81.0 AGE-RELATED OSTEOPOROSIS WITHOUT CURRENT PATHOLOGICAL FRACTURE: Status: ACTIVE | Noted: 2024-05-22

## 2024-05-22 PROCEDURE — 99214 OFFICE O/P EST MOD 30 MIN: CPT | Performed by: INTERNAL MEDICINE

## 2024-05-22 PROCEDURE — G0439 PPPS, SUBSEQ VISIT: HCPCS | Performed by: INTERNAL MEDICINE

## 2024-05-22 NOTE — ASSESSMENT & PLAN NOTE
Still has some dilated veins in her lower extremity right more than left.  Did recommend the usage of compression socks.  At least in the winter

## 2024-05-22 NOTE — PROGRESS NOTES
Ambulatory Visit  Name: Sabina Trotter      : 1945      MRN: [id number]  Encounter Provider: Renetta Myrick MD  Encounter Date: 2024   Encounter department: [facility] [place]    Assessment & Plan   1. Medicare annual wellness visit, subsequent  2. Rheumatoid arthritis of multiple sites with negative rheumatoid factor (HCC)  Assessment & Plan:  Does experience some stiffness in the mornings but then when she gets moving she is okay.  She is satisfied with the present management of her medications.  3. Essential hypertension  Assessment & Plan:  Hypertension is well-controlled on the present medication regimen.  Regular with her medications and her exercise and diet.  She is managing her condition very well  4. Varicose veins of bilateral lower extremities with other complications  Assessment & Plan:  Still has some dilated veins in her lower extremity right more than left.  Did recommend the usage of compression socks.  At least in the winter       Preventive health issues were discussed with patient, and age appropriate screening tests were ordered as noted in patient's After Visit Summary. Personalized health advice and appropriate referrals for health education or preventive services given if needed, as noted in patient's After Visit Summary.    History of Present Illness       HPI     78-year-old lady medical history significant for hypertension, hyperlipidemia, hypothyroidism seronegative rheumatoid arthritis, osteoporosis and bilateral lower extremity varicose veins here today for follow-up visit..  Personal history of dense breast and history of breast cancer in her mother and maternal aunt and history of endometrial cancer in her cousin.  She is compliant with her mammograms and gets them done on the regular screening schedule  She does have regular visits with rheumatology and GYN as well.  He has a history of osteoporosis and is presently on Fosamax for it.   Has not been placed on Prolia.      Patient Care Team:  Renetta Myrick MD as PCP - General (Internal Medicine)  Neyda Palencia MD as Endoscopist  Nelson Joauqin MD (Rheumatology)  Billy Marc MD (Ophthalmology)    Review of Systems   Cardiovascular:  Positive for leg swelling.        Varicose veins lower legs   Musculoskeletal:         Mild thoracic kyphosis   All other systems reviewed and are negative.    Medical History Reviewed by provider this encounter:  Tobacco  Allergies  Meds  Problems  Med Hx  Surg Hx  Fam Hx       Annual Wellness Visit Questionnaire   Sabina is here for her Subsequent Wellness visit. Last Medicare Wellness visit information reviewed, patient interviewed and updates made to the record today.      Health Risk Assessment:   Patient rates overall health as good. Patient feels that their physical health rating is same. Patient is satisfied with their life. Eyesight was rated as slightly worse. Hearing was rated as slightly worse. Patient feels that their emotional and mental health rating is same. Patients states they are never, rarely angry. Patient states they are sometimes unusually tired/fatigued. Pain experienced in the last 7 days has been some. Patient's pain rating has been 2/10. Patient states that she has experienced no weight loss or gain in last 6 months.     Depression Screening:   PHQ-2 Score: 0      Fall Risk Screening:   In the past year, patient has experienced: no history of falling in past year      Urinary Incontinence Screening:   Patient has not leaked urine accidently in the last six months.     Home Safety:  Patient does not have trouble with stairs inside or outside of their home. Patient has working smoke alarms and has no working carbon monoxide detector. Home safety hazards include: none.     Nutrition:   Current diet is Regular.     Medications:   Patient is not currently taking any over-the-counter supplements. Patient is able to manage  medications.     Activities of Daily Living (ADLs)/Instrumental Activities of Daily Living (IADLs):   Walk and transfer into and out of bed and chair?: Yes  Dress and groom yourself?: Yes    Bathe or shower yourself?: Yes    Feed yourself? Yes  Do your laundry/housekeeping?: Yes  Manage your money, pay your bills and track your expenses?: Yes  Make your own meals?: Yes    Do your own shopping?: Yes    Previous Hospitalizations:   Any hospitalizations or ED visits within the last 12 months?: No      Advance Care Planning:   Living will: Yes    Durable POA for healthcare: No    Advanced directive: Yes    End of Life Decisions reviewed with patient: Yes    Provider agrees with end of life decisions: Yes      PREVENTIVE SCREENINGS      Cardiovascular Screening:    General: Screening Not Indicated and History Lipid Disorder      Diabetes Screening:     General: Screening Current      Colorectal Cancer Screening:     General: Screening Current      Breast Cancer Screening:     General: Screening Current      Cervical Cancer Screening:    General: Screening Not Indicated      Lung Cancer Screening:     General: Screening Not Indicated    Screening, Brief Intervention, and Referral to Treatment (SBIRT)    Screening  Typical number of drinks in a day: 0  Typical number of drinks in a week: 0  Interpretation: Low risk drinking behavior.    AUDIT-C Screenin) How often did you have a drink containing alcohol in the past year? monthly or less  2) How many drinks did you have on a typical day when you were drinking in the past year? 1 to 2  3) How often did you have 6 or more drinks on one occasion in the past year? never    AUDIT-C Score: 1  Interpretation: Score 0-2 (female): Negative screen for alcohol misuse    Single Item Drug Screening:  How often have you used an illegal drug (including marijuana) or a prescription medication for non-medical reasons in the past year? never    Single Item Drug Screen Score:  0  Interpretation: Negative screen for possible drug use disorder    Brief Intervention  Alcohol & drug use screenings were reviewed. No concerns regarding substance use disorder identified.     Other Counseling Topics:   Car/seat belt/driving safety, skin self-exam, sunscreen and calcium and vitamin D intake and regular weightbearing exercise.     Social Determinants of Health     Financial Resource Strain: Low Risk  (5/17/2023)    Overall Financial Resource Strain (CARDIA)    • Difficulty of Paying Living Expenses: Not hard at all   Food Insecurity: No Food Insecurity (5/22/2024)    Hunger Vital Sign    • Worried About Running Out of Food in the Last Year: Never true    • Ran Out of Food in the Last Year: Never true   Transportation Needs: No Transportation Needs (5/22/2024)    PRAPARE - Transportation    • Lack of Transportation (Medical): No    • Lack of Transportation (Non-Medical): No   Housing Stability: Low Risk  (5/22/2024)    Housing Stability Vital Sign    • Unable to Pay for Housing in the Last Year: No    • Number of Times Moved in the Last Year: 0    • Homeless in the Last Year: No   Utilities: Not At Risk (5/22/2024)    [facility] Utilities    • Threatened with loss of utilities: No     No results found.    Objective     /68 (BP Location: Left arm, Patient Position: Sitting, Cuff Size: Standard)   Pulse 67   Temp 98.3 °F (36.8 °C) (Temporal)   Ht 5' (1.524 m)   Wt 60.9 kg (134 lb 3.2 oz)   SpO2 98%   BMI 26.21 kg/m²     Physical Exam    Gen: NAD  Heent: atraumatic, normocephalic  Mouth: is moist  Neck: is supple, no JVD, no carotid bruits.   Heart: is regular Normal s1, s2,  Lungs: are clear to auscultation  Abd: soft, non tender, NABS  Ext: trace edema 1 + dilated veins bilateral edema, distal pulses normal  Skin: no rashes, ulcers  Mood: normal affect  Neuro: AAOx3       Administrative Statements   I have spent a total time of 25 minutes on 05/22/24 In caring for this patient including  Diagnostic results, Prognosis, Risks and benefits of tx options, Instructions for management, Patient and family education, Importance of tx compliance, Risk factor reductions, Impressions, Counseling / Coordination of care, Documenting in the medical record, Reviewing / ordering tests, medicine, procedures  , Obtaining or reviewing history  , and Communicating with other healthcare professionals .

## 2024-05-22 NOTE — PATIENT INSTRUCTIONS
Medicare Preventive Visit Patient Instructions  Thank you for completing your Welcome to Medicare Visit or Medicare Annual Wellness Visit today. Your next wellness visit will be due in one year (5/23/2025).  The screening/preventive services that you may require over the next 5-10 years are detailed below. Some tests may not apply to you based off risk factors and/or age. Screening tests ordered at today's visit but not completed yet may show as past due. Also, please note that scanned in results may not display below.  Preventive Screenings:  Service Recommendations Previous Testing/Comments   Colorectal Cancer Screening  * Colonoscopy    * Fecal Occult Blood Test (FOBT)/Fecal Immunochemical Test (FIT)  * Fecal DNA/Cologuard Test  * Flexible Sigmoidoscopy Age: 45-75 years old   Colonoscopy: every 10 years (may be performed more frequently if at higher risk)  OR  FOBT/FIT: every 1 year  OR  Cologuard: every 3 years  OR  Sigmoidoscopy: every 5 years  Screening may be recommended earlier than age 45 if at higher risk for colorectal cancer. Also, an individualized decision between you and your healthcare provider will decide whether screening between the ages of 76-85 would be appropriate. Colonoscopy: 11/30/2022  FOBT/FIT: Not on file  Cologuard: Not on file  Sigmoidoscopy: Not on file    Screening Current     Breast Cancer Screening Age: 40+ years old  Frequency: every 1-2 years  Not required if history of left and right mastectomy Mammogram: 09/08/2023    Screening Current   Cervical Cancer Screening Between the ages of 21-29, pap smear recommended once every 3 years.   Between the ages of 30-65, can perform pap smear with HPV co-testing every 5 years.   Recommendations may differ for women with a history of total hysterectomy, cervical cancer, or abnormal pap smears in past. Pap Smear: 09/06/2023    Screening Not Indicated   Hepatitis C Screening Once for adults born between 1945 and 1965  More frequently in  patients at high risk for Hepatitis C Hep C Antibody: Not on file        Diabetes Screening 1-2 times per year if you're at risk for diabetes or have pre-diabetes Fasting glucose: No results in last 5 years (No results in last 5 years)  A1C: 6.1 % of total Hgb (5/10/2024)  Screening Current   Cholesterol Screening Once every 5 years if you don't have a lipid disorder. May order more often based on risk factors. Lipid panel: 05/10/2024    Screening Not Indicated  History Lipid Disorder     Other Preventive Screenings Covered by Medicare:  Abdominal Aortic Aneurysm (AAA) Screening: covered once if your at risk. You're considered to be at risk if you have a family history of AAA.  Lung Cancer Screening: covers low dose CT scan once per year if you meet all of the following conditions: (1) Age 55-77; (2) No signs or symptoms of lung cancer; (3) Current smoker or have quit smoking within the last 15 years; (4) You have a tobacco smoking history of at least 20 pack years (packs per day multiplied by number of years you smoked); (5) You get a written order from a healthcare provider.  Glaucoma Screening: covered annually if you're considered high risk: (1) You have diabetes OR (2) Family history of glaucoma OR (3)  aged 50 and older OR (4)  American aged 65 and older  Osteoporosis Screening: covered every 2 years if you meet one of the following conditions: (1) You're estrogen deficient and at risk for osteoporosis based off medical history and other findings; (2) Have a vertebral abnormality; (3) On glucocorticoid therapy for more than 3 months; (4) Have primary hyperparathyroidism; (5) On osteoporosis medications and need to assess response to drug therapy.   Last bone density test (DXA Scan): 10/06/2023.  HIV Screening: covered annually if you're between the age of 15-65. Also covered annually if you are younger than 15 and older than 65 with risk factors for HIV infection. For pregnant patients,  it is covered up to 3 times per pregnancy.    Immunizations:  Immunization Recommendations   Influenza Vaccine Annual influenza vaccination during flu season is recommended for all persons aged >= 6 months who do not have contraindications   Pneumococcal Vaccine   * Pneumococcal conjugate vaccine = PCV13 (Prevnar 13), PCV15 (Vaxneuvance), PCV20 (Prevnar 20)  * Pneumococcal polysaccharide vaccine = PPSV23 (Pneumovax) Adults 19-63 yo with certain risk factors or if 65+ yo  If never received any pneumonia vaccine: recommend Prevnar 20 (PCV20)  Give PCV20 if previously received 1 dose of PCV13 or PPSV23   Hepatitis B Vaccine 3 dose series if at intermediate or high risk (ex: diabetes, end stage renal disease, liver disease)   Respiratory syncytial virus (RSV) Vaccine - COVERED BY MEDICARE PART D  * RSVPreF3 (Arexvy) CDC recommends that adults 60 years of age and older may receive a single dose of RSV vaccine using shared clinical decision-making (SCDM)   Tetanus (Td) Vaccine - COST NOT COVERED BY MEDICARE PART B Following completion of primary series, a booster dose should be given every 10 years to maintain immunity against tetanus. Td may also be given as tetanus wound prophylaxis.   Tdap Vaccine - COST NOT COVERED BY MEDICARE PART B Recommended at least once for all adults. For pregnant patients, recommended with each pregnancy.   Shingles Vaccine (Shingrix) - COST NOT COVERED BY MEDICARE PART B  2 shot series recommended in those 19 years and older who have or will have weakened immune systems or those 50 years and older     Health Maintenance Due:      Topic Date Due   • Hepatitis C Screening  Never done   • Breast Cancer Screening: Mammogram  09/08/2024   • Colorectal Cancer Screening  11/30/2029     Immunizations Due:      Topic Date Due   • COVID-19 Vaccine (9 - 2023-24 season) 11/30/2023     Advance Directives   What are advance directives?  Advance directives are legal documents that state your wishes and  plans for medical care. These plans are made ahead of time in case you lose your ability to make decisions for yourself. Advance directives can apply to any medical decision, such as the treatments you want, and if you want to donate organs.   What are the types of advance directives?  There are many types of advance directives, and each state has rules about how to use them. You may choose a combination of any of the following:  Living will:  This is a written record of the treatment you want. You can also choose which treatments you do not want, which to limit, and which to stop at a certain time. This includes surgery, medicine, IV fluid, and tube feedings.   Durable power of  for healthcare (DPAHC):  This is a written record that states who you want to make healthcare choices for you when you are unable to make them for yourself. This person, called a proxy, is usually a family member or a friend. You may choose more than 1 proxy.  Do not resuscitate (DNR) order:  A DNR order is used in case your heart stops beating or you stop breathing. It is a request not to have certain forms of treatment, such as CPR. A DNR order may be included in other types of advance directives.  Medical directive:  This covers the care that you want if you are in a coma, near death, or unable to make decisions for yourself. You can list the treatments you want for each condition. Treatment may include pain medicine, surgery, blood transfusions, dialysis, IV or tube feedings, and a ventilator (breathing machine).  Values history:  This document has questions about your views, beliefs, and how you feel and think about life. This information can help others choose the care that you would choose.  Why are advance directives important?  An advance directive helps you control your care. Although spoken wishes may be used, it is better to have your wishes written down. Spoken wishes can be misunderstood, or not followed. Treatments  may be given even if you do not want them. An advance directive may make it easier for your family to make difficult choices about your care.   Weight Management   Why it is important to manage your weight:  Being overweight increases your risk of health conditions such as heart disease, high blood pressure, type 2 diabetes, and certain types of cancer. It can also increase your risk for osteoarthritis, sleep apnea, and other respiratory problems. Aim for a slow, steady weight loss. Even a small amount of weight loss can lower your risk of health problems.  How to lose weight safely:  A safe and healthy way to lose weight is to eat fewer calories and get regular exercise. You can lose up about 1 pound a week by decreasing the number of calories you eat by 500 calories each day.   Healthy meal plan for weight management:  A healthy meal plan includes a variety of foods, contains fewer calories, and helps you stay healthy. A healthy meal plan includes the following:  Eat whole-grain foods more often.  A healthy meal plan should contain fiber. Fiber is the part of grains, fruits, and vegetables that is not broken down by your body. Whole-grain foods are healthy and provide extra fiber in your diet. Some examples of whole-grain foods are whole-wheat breads and pastas, oatmeal, brown rice, and bulgur.  Eat a variety of vegetables every day.  Include dark, leafy greens such as spinach, kale, parviz greens, and mustard greens. Eat yellow and orange vegetables such as carrots, sweet potatoes, and winter squash.   Eat a variety of fruits every day.  Choose fresh or canned fruit (canned in its own juice or light syrup) instead of juice. Fruit juice has very little or no fiber.  Eat low-fat dairy foods.  Drink fat-free (skim) milk or 1% milk. Eat fat-free yogurt and low-fat cottage cheese. Try low-fat cheeses such as mozzarella and other reduced-fat cheeses.  Choose meat and other protein foods that are low in fat.  Choose  beans or other legumes such as split peas or lentils. Choose fish, skinless poultry (chicken or turkey), or lean cuts of red meat (beef or pork). Before you cook meat or poultry, cut off any visible fat.   Use less fat and oil.  Try baking foods instead of frying them. Add less fat, such as margarine, sour cream, regular salad dressing and mayonnaise to foods. Eat fewer high-fat foods. Some examples of high-fat foods include french fries, doughnuts, ice cream, and cakes.  Eat fewer sweets.  Limit foods and drinks that are high in sugar. This includes candy, cookies, regular soda, and sweetened drinks.  Exercise:  Exercise at least 30 minutes per day on most days of the week. Some examples of exercise include walking, biking, dancing, and swimming. You can also fit in more physical activity by taking the stairs instead of the elevator or parking farther away from stores. Ask your healthcare provider about the best exercise plan for you.      © Copyright [employer] 2018 Information is for End User's use only and may not be sold, redistributed or otherwise used for commercial purposes. All illustrations and images included in CareNotes® are the copyrighted property of [employer].D.A[employer]M., Inc[employer] or [employer]

## 2024-05-22 NOTE — ASSESSMENT & PLAN NOTE
Hypertension is well-controlled on the present medication regimen.  Regular with her medications and her exercise and diet.  She is managing her condition very well

## 2024-05-22 NOTE — TELEPHONE ENCOUNTER
Sabina wanted to check if any lab work needs to be completed before the next appointment for both herself and  (Nelson Trotter)

## 2024-05-22 NOTE — ASSESSMENT & PLAN NOTE
Does experience some stiffness in the mornings but then when she gets moving she is okay.  She is satisfied with the present management of her medications.

## 2024-06-18 DIAGNOSIS — I10 ESSENTIAL HYPERTENSION: ICD-10-CM

## 2024-06-18 RX ORDER — AMLODIPINE BESYLATE 5 MG/1
5 TABLET ORAL DAILY
Qty: 90 TABLET | Refills: 1 | Status: SHIPPED | OUTPATIENT
Start: 2024-06-18

## 2024-06-18 NOTE — TELEPHONE ENCOUNTER
Reason for call:   [x] Refill   [] Prior Auth  [] Other:     Office:   [x] PCP/Provider - Renetta Myrick  [] Specialty/Provider -     Medication: amLODIPine (NORVASC) 5 mg tablet     Dose/Frequency: Take 1 tablet (5 mg total) by mouth daily     Quantity: 90    Pharmacy: [id number] - [place] - [address] [place] [address]     Does the patient have enough for 3 days?   [] Yes   [x] No - Send as HP to POD

## 2024-07-09 ENCOUNTER — TELEPHONE (OUTPATIENT)
Age: 79
End: 2024-07-09

## 2024-07-09 NOTE — TELEPHONE ENCOUNTER
Received coding error form, from [employer]. Corrected by  and faxed back to [employer] with updated codes. Form has been scanned into chart for review.

## 2024-07-24 DIAGNOSIS — E03.9 HYPOTHYROIDISM, UNSPECIFIED TYPE: ICD-10-CM

## 2024-07-24 RX ORDER — LEVOTHYROXINE SODIUM 88 UG/1
88 TABLET ORAL DAILY
Qty: 100 TABLET | Refills: 1 | Status: SHIPPED | OUTPATIENT
Start: 2024-07-24

## 2024-07-24 NOTE — TELEPHONE ENCOUNTER
Reason for call:   [x] Refill   [] Prior Auth  [] Other:     Office:   [x] PCP/Provider - On license of [facility]   [] Specialty/Provider -     Medication:   levothyroxine 88 mcg tablet - Take 1 tablet (88 mcg total) by mouth daily     Pharmacy:   [id number] - [place] - [address] [facility]     Does the patient have enough for 3 days?   [x] Yes   [] No - Send as HP to POD

## 2024-08-14 DIAGNOSIS — G47.09 OTHER INSOMNIA: ICD-10-CM

## 2024-08-14 DIAGNOSIS — F41.1 GAD (GENERALIZED ANXIETY DISORDER): ICD-10-CM

## 2024-08-14 NOTE — TELEPHONE ENCOUNTER
Reason for call:   [x] Refill   [] Prior Auth  [] Other:     Office:   [x] PCP/Provider -   [] Specialty/Provider -     Medication: mirtazapine (REMERON) 7.5 MG tablet     Dose/Frequency: Take 1 tablet (7.5 mg total) by mouth daily at bedtime     Quantity: 90    Pharmacy: [id number] - [place] [address]      Does the patient have enough for 3 days?   [x] Yes   [] No - Send as HP to POD

## 2024-08-15 RX ORDER — MIRTAZAPINE 7.5 MG/1
7.5 TABLET, FILM COATED ORAL
Qty: 90 TABLET | Refills: 1 | Status: SHIPPED | OUTPATIENT
Start: 2024-08-15

## 2024-09-06 ENCOUNTER — HOSPITAL ENCOUNTER (OUTPATIENT)
Dept: RADIOLOGY | Facility: HOSPITAL | Age: 79
Discharge: HOME | End: 2024-09-06
Attending: INTERNAL MEDICINE
Payer: MEDICARE

## 2024-09-06 DIAGNOSIS — M17.0 OSTEOARTHRITIS OF BOTH KNEES, UNSPECIFIED OSTEOARTHRITIS TYPE: ICD-10-CM

## 2024-09-06 PROCEDURE — 73562 X-RAY EXAM OF KNEE 3: CPT

## 2024-09-13 ENCOUNTER — HOSPITAL ENCOUNTER (OUTPATIENT)
Dept: RADIOLOGY | Age: 79
Discharge: HOME | End: 2024-09-13
Payer: MEDICARE

## 2024-09-13 DIAGNOSIS — Z12.31 VISIT FOR SCREENING MAMMOGRAM: ICD-10-CM

## 2024-09-13 PROCEDURE — 77063 BREAST TOMOSYNTHESIS BI: CPT

## 2024-09-13 PROCEDURE — 77067 SCR MAMMO BI INCL CAD: CPT

## 2024-10-01 DIAGNOSIS — I10 ESSENTIAL HYPERTENSION: ICD-10-CM

## 2024-10-01 RX ORDER — LISINOPRIL 20 MG/1
20 TABLET ORAL DAILY
Qty: 90 TABLET | Refills: 1 | Status: SHIPPED | OUTPATIENT
Start: 2024-10-01

## 2024-10-01 NOTE — TELEPHONE ENCOUNTER
Reason for call:   [x] Refill   [] Prior Auth  [] Other:     Office:   [x] PCP/Provider -   [] Specialty/Provider -     Medication: Lisinopril 20 mg, take 1 tablet by mouth daily       Pharmacy: [facility] [place] [facility]     Does the patient have enough for 3 days?   [x] Yes   [] No - Send as HP to POD

## 2024-10-11 DIAGNOSIS — Z13.820 SCREENING FOR OSTEOPOROSIS: ICD-10-CM

## 2024-10-11 RX ORDER — ALENDRONATE SODIUM 70 MG/1
TABLET ORAL
Qty: 12 TABLET | Refills: 1 | Status: SHIPPED | OUTPATIENT
Start: 2024-10-11

## 2024-10-28 DIAGNOSIS — E78.2 MIXED HYPERLIPIDEMIA: ICD-10-CM

## 2024-10-28 RX ORDER — LOVASTATIN 20 MG/1
20 TABLET ORAL
Qty: 90 TABLET | Refills: 1 | Status: SHIPPED | OUTPATIENT
Start: 2024-10-28

## 2024-10-28 NOTE — TELEPHONE ENCOUNTER
Reason for call:   [x] Refill   [] Prior Auth  [] Other:     Office:   [x] PCP/Provider - Renetta Myrick MD   [] Specialty/Provider -     Medication: lovastatin (MEVACOR) 20 mg tablet     Dose/Frequency: Take 1 tablet (20 mg total) by mouth daily at bedtime     Quantity: 90    Pharmacy: [id number]  [place] [address]     Does the patient have enough for 3 days?   [] Yes   [x] No - Send as HP to POD

## 2024-12-07 DIAGNOSIS — I10 ESSENTIAL HYPERTENSION: ICD-10-CM

## 2024-12-09 RX ORDER — AMLODIPINE BESYLATE 5 MG/1
5 TABLET ORAL DAILY
Qty: 90 TABLET | Refills: 1 | Status: SHIPPED | OUTPATIENT
Start: 2024-12-09

## 2024-12-27 ENCOUNTER — RESULTS FOLLOW-UP (OUTPATIENT)
Age: 79
End: 2024-12-27

## 2024-12-27 DIAGNOSIS — R79.89 ABNORMAL TSH: Primary | ICD-10-CM

## 2024-12-27 DIAGNOSIS — E03.9 HYPOTHYROIDISM, UNSPECIFIED TYPE: ICD-10-CM

## 2024-12-27 LAB
CHOLEST SERPL-MCNC: 179 MG/DL
CHOLEST/HDLC SERPL: 2.8 (CALC)
HBA1C MFR BLD: 5.8 % OF TOTAL HGB
HDLC SERPL-MCNC: 65 MG/DL
LDLC SERPL CALC-MCNC: 98 MG/DL (CALC)
NONHDLC SERPL-MCNC: 114 MG/DL (CALC)
T4 FREE SERPL-MCNC: 1.3 NG/DL (ref 0.8–1.8)
TRIGL SERPL-MCNC: 75 MG/DL
TSH SERPL-ACNC: 4.57 MIU/L (ref 0.4–4.5)

## 2024-12-30 NOTE — TELEPHONE ENCOUNTER
----- Message from Opal GONZALES sent at 12/30/2024  8:47 AM EST -----  Spoke with patient and verbalized understanding. Patient requesting script mailed to her.

## 2025-01-11 LAB — TSH SERPL-ACNC: 3.93 MIU/L (ref 0.4–4.5)

## 2025-01-21 ENCOUNTER — OFFICE VISIT (OUTPATIENT)
Age: 80
End: 2025-01-21
Payer: MEDICARE

## 2025-01-21 VITALS
OXYGEN SATURATION: 100 % | HEIGHT: 60 IN | SYSTOLIC BLOOD PRESSURE: 126 MMHG | TEMPERATURE: 98.1 F | WEIGHT: 127 LBS | HEART RATE: 71 BPM | BODY MASS INDEX: 24.94 KG/M2 | DIASTOLIC BLOOD PRESSURE: 82 MMHG

## 2025-01-21 DIAGNOSIS — I10 ESSENTIAL HYPERTENSION: ICD-10-CM

## 2025-01-21 DIAGNOSIS — M06.09 RHEUMATOID ARTHRITIS OF MULTIPLE SITES WITH NEGATIVE RHEUMATOID FACTOR (HCC): ICD-10-CM

## 2025-01-21 DIAGNOSIS — E78.2 MIXED HYPERLIPIDEMIA: ICD-10-CM

## 2025-01-21 DIAGNOSIS — E03.4 HYPOTHYROIDISM DUE TO ACQUIRED ATROPHY OF THYROID: ICD-10-CM

## 2025-01-21 DIAGNOSIS — I83.893 VARICOSE VEINS OF BILATERAL LOWER EXTREMITIES WITH OTHER COMPLICATIONS: Primary | ICD-10-CM

## 2025-01-21 PROCEDURE — 99214 OFFICE O/P EST MOD 30 MIN: CPT | Performed by: INTERNAL MEDICINE

## 2025-01-21 PROCEDURE — G2211 COMPLEX E/M VISIT ADD ON: HCPCS | Performed by: INTERNAL MEDICINE

## 2025-01-21 RX ORDER — DEXAMETHASONE 0.7 MG/1
0.7 IMPLANT INTRAVITREAL
COMMUNITY

## 2025-01-21 NOTE — PROGRESS NOTES
Name: Sabina Trotter      : 1945      MRN: [id number]  Encounter Provider: Renetta Myrick MD  Encounter Date: 2025   Encounter department: [facility] [place]    Assessment & Plan  Varicose veins of bilateral lower extremities with other complications         Rheumatoid arthritis of multiple sites with negative rheumatoid factor (HCC)         Mixed hyperlipidemia         Essential hypertension         Hypothyroidism due to acquired atrophy of thyroid    Orders:  •  TSH, 3rd generation with Free T4 reflex; Future      Assessment & Plan  1. Hypertension.  Her blood pressure is well-regulated. She should continue her current medication regimen.    2. Thyroid disorder.  Her thyroid function is within normal parameters. She should continue her current thyroid medication dosage of 88 mcg. Thyroid function will be rechecked at her next visit.    3. Blood glucose management.  Her hemoglobin A1c levels have shown improvement. She should continue monitoring her blood sugar levels and maintain her current lifestyle modifications.    4. Back pain.  She reports that her back pain is manageable and comes and goes. No new interventions are needed at this time.    5. Joint pain.  She received a shot for her kneecap a couple of months ago, which helped. She should continue monitoring her joint pain and report any changes.    6. Health Maintenance.  Her cholesterol levels are excellent. She is due for her breast cancer screening in September and her wellness visit after May 22. She had a colon screening 2 years ago and is not due for another one this year.    Follow-up  The patient will follow up in 6 months.    PROCEDURE  The patient received an injection for her kneecap a few months ago, which provided relief.     Chief Complaint   Patient presents with   • Follow-up      History of Present Illness     History of Present Illness  The patient presents for a follow-up of her usual  cholesterol, hypertension, thyroid, blood glucose, and back pain.    She has been adhering to her prescribed medication regimen without any alterations.    Her thyroid function was recently evaluated, and she believes the results were within normal limits. She reports no symptoms indicative of thyroid dysfunction, such as tachycardia. She has been on a consistent dose of 88 mcg of thyroid medication for several years.    She has been making efforts to maintain her blood glucose levels within the normal range, even during the holiday season.    She experiences intermittent back pain, which she manages with vitamin supplements. She has discontinued alendronate as per her rheumatologist's advice after a 5-year course. She is scheduled to start Prolia injections at the end of the year, to be administered biannually. She also takes Citracal and vitamin D supplements.    She reports no joint pain and has not undergone any joint replacement surgeries. A few months ago, she received an injection for her kneecap, which provided relief.    MEDICATIONS  Current: amlodipine, metoprolol, Lasix, alendronate, Prolia, Citracal, vitamin D  Review of Systems   HENT:  Positive for hearing loss.         Mild bilateral   Eyes:         Glasses   All other systems reviewed and are negative.    Past Medical History:   Diagnosis Date   • Allergic 1952   • Arthritis 2007   • Cataract    • Disease of thyroid gland     hypothyrodism    • HL (hearing loss)    • Hypertension    • Lumbago    • Pure hypercholesterolemia    • Rheumatoid arthritis (HCC)    • Stenosis of rectum and anus      Past Surgical History:   Procedure Laterality Date   • CATARACT EXTRACTION Bilateral    • CERVIX SURGERY     • HEMORROIDECTOMY     • MAMMO (HISTORICAL)  2020 [facility]   • MENISCECTOMY     • OR COLONOSCOPY FLX DX W/COLLJ SPEC WHEN PFRMD N/A 11/17/2017    Procedure: COLONOSCOPY;  Surgeon: Neyda Palencia MD;  Location: [facility];  Service: Colorectal     Family  History   Problem Relation Age of Onset   • Breast cancer Mother 54   • Thyroid disease Mother    • Hearing loss Father    • Heart disease Father    • Prostate cancer Brother 69   • Breast cancer Maternal Aunt 60   • Prostate cancer Paternal Uncle 80   • Endometrial cancer Cousin 58   • No Known Problems Daughter    • No Known Problems Maternal Grandmother    • No Known Problems Maternal Grandfather    • No Known Problems Paternal Grandmother    • No Known Problems Paternal Grandfather    • No Known Problems Daughter      Social History     Tobacco Use   • Smoking status: Never   • Smokeless tobacco: Never   Vaping Use   • Vaping status: Never Used   Substance and Sexual Activity   • Alcohol use: Not Currently     Comment: 1 drink new years oren   • Drug use: No   • Sexual activity: Not Currently     Current Outpatient Medications on File Prior to Visit   Medication Sig   • amLODIPine (NORVASC) 5 mg tablet take 1 tablet by mouth once daily   • Cholecalciferol (Vitamin D-3) 25 MCG (1000 UT) CAPS Take by mouth in the morning   • dexAMETHasone (Ozurdex) 0.7 MG IMPL 0.7 mg by Intravitreal route every 3 (three) months Every 11 wks, started 10/25/2022   • folic acid (FOLVITE) 1 mg tablet Take 800 mcg by mouth 4 (four) times a week    • levothyroxine 88 mcg tablet Take 1 tablet (88 mcg total) by mouth daily   • lisinopril (ZESTRIL) 20 mg tablet Take 1 tablet (20 mg total) by mouth daily   • lovastatin (MEVACOR) 20 mg tablet Take 1 tablet (20 mg total) by mouth daily at bedtime   • methotrexate 2.5 mg tablet Take 15 mg by mouth once a week    • mirtazapine (REMERON) 7.5 MG tablet Take 1 tablet (7.5 mg total) by mouth daily at bedtime   • Multiple Minerals-Vitamins (CITRACAL PLUS PO) Take by mouth 200 mg daily   • multivitamin (THERAGRAN) TABS Take 1 tablet by mouth 4 (four) times a week   • timolol (TIMOPTIC) 0.25 % ophthalmic solution instill 1 drop into right eye twice a day   • [DISCONTINUED] alendronate (FOSAMAX) 70 mg  tablet take 1 tablet by mouth every week before breakfast     Allergies   Allergen Reactions   • Penicillins Hives     Immunization History   Administered Date(s) Administered   • COVID-19 MODERNA VACC 0.5 ML IM 01/23/2021, 01/23/2021, 02/22/2021, 02/22/2021, 11/08/2021, 04/06/2022, 09/22/2022   • COVID-19 Moderna Vac BIVALENT 12 Yr+ IM 0.5 ML 09/22/2022   • COVID-19 Moderna mRNA Vaccine 12 Yr+ 50 mcg/0.5 mL (Spikevax) 10/05/2023, 10/24/2024   • H1N1, All Formulations 01/08/2010   • INFLUENZA 10/29/2018, 10/29/2018, 10/23/2019, 10/23/2019, 08/25/2020, 08/25/2020, 10/18/2021, 10/07/2022, 10/19/2023   • Influenza, high dose seasonal 0.7 mL 10/18/2021, 10/07/2022, 10/19/2023   • MMR 10/17/2017, 10/17/2017   • Pneumococcal Conjugate 13-Valent 03/30/2016, 03/30/2016   • Pneumococcal Polysaccharide PPV23 12/09/2013, 12/09/2013   • Respiratory Syncytial Virus Vaccine (Recombinant) 11/24/2023   • Tdap 10/17/2016, 10/17/2016, 04/06/2023   • Zoster 06/01/2015, 07/16/2015, 07/16/2015   • Zoster Vaccine Recombinant 02/05/2019, 06/09/2019   • influenza, trivalent, adjuvanted 10/31/2024     Objective   /82 (BP Location: Left arm, Patient Position: Sitting, Cuff Size: Adult)   Pulse 71   Temp 98.1 °F (36.7 °C) (Temporal)   Ht 5' (1.524 m)   Wt 57.6 kg (127 lb)   SpO2 100%   BMI 24.80 kg/m²     Physical Exam  .   Physical Exam  Gen: NAD  Heent: atraumatic, normocephalic  Mouth: is moist  Neck: is supple, no JVD, no carotid bruits.   Heart: is regular Normal s1, s2,  Lungs: are clear to auscultation  Abd: soft, non tender, NABS  Ext: no edema, distal pulses normal  Skin: no rashes, ulcers  Mood: normal affect  Neuro: AAOx3   There is a slight curvature in the thoracic spine area

## 2025-02-03 DIAGNOSIS — F41.1 GAD (GENERALIZED ANXIETY DISORDER): ICD-10-CM

## 2025-02-03 DIAGNOSIS — E03.9 HYPOTHYROIDISM, UNSPECIFIED TYPE: ICD-10-CM

## 2025-02-03 DIAGNOSIS — G47.09 OTHER INSOMNIA: ICD-10-CM

## 2025-02-04 RX ORDER — LEVOTHYROXINE SODIUM 88 UG/1
88 TABLET ORAL DAILY
Qty: 90 TABLET | Refills: 1 | Status: SHIPPED | OUTPATIENT
Start: 2025-02-04

## 2025-02-04 RX ORDER — MIRTAZAPINE 7.5 MG/1
7.5 TABLET, FILM COATED ORAL
Qty: 90 TABLET | Refills: 1 | Status: SHIPPED | OUTPATIENT
Start: 2025-02-04

## 2025-02-10 ENCOUNTER — TELEPHONE (OUTPATIENT)
Age: 80
End: 2025-02-10

## 2025-02-10 NOTE — TELEPHONE ENCOUNTER
Patient called requesting refill for   mirtazapine (REMERON) 7.5 MG tablet . Patient made aware medication was refilled on 02/04/25 for 90 with 1 refills to [facility] pharmacy. Patient instructed to contact the pharmacy to obtain refills of medication. Patient verbalized understanding.

## 2025-03-18 ENCOUNTER — HOSPITAL ENCOUNTER (INPATIENT)
Facility: HOSPITAL | Age: 80
LOS: 3 days | Discharge: HOME | DRG: 123 | End: 2025-03-21
Attending: EMERGENCY MEDICINE | Admitting: PSYCHIATRY & NEUROLOGY
Payer: MEDICARE

## 2025-03-18 ENCOUNTER — APPOINTMENT (EMERGENCY)
Dept: RADIOLOGY | Facility: HOSPITAL | Age: 80
DRG: 123 | End: 2025-03-18
Payer: MEDICARE

## 2025-03-18 DIAGNOSIS — H54.61 VISUAL LOSS, RIGHT EYE: Primary | ICD-10-CM

## 2025-03-18 PROBLEM — H34.8112 CENTRAL RETINAL VEIN OCCLUSION OF RIGHT EYE: Status: ACTIVE | Noted: 2025-03-18

## 2025-03-18 LAB
ANION GAP SERPL CALCULATED.3IONS-SCNC: 7 MMOL/L (ref 4–13)
APTT PPP: 31 SECONDS (ref 23–34)
BUN SERPL-MCNC: 12 MG/DL (ref 5–25)
CALCIUM SERPL-MCNC: 8.9 MG/DL (ref 8.4–10.2)
CHLORIDE SERPL-SCNC: 101 MMOL/L (ref 96–108)
CO2 SERPL-SCNC: 27 MMOL/L (ref 21–32)
CREAT SERPL-MCNC: 0.5 MG/DL (ref 0.6–1.3)
CRP SERPL QL: 15 MG/L
ERYTHROCYTE [DISTWIDTH] IN BLOOD BY AUTOMATED COUNT: 14.7 % (ref 11.6–15.1)
ERYTHROCYTE [SEDIMENTATION RATE] IN BLOOD: 47 MM/HOUR (ref 0–29)
GFR SERPL CREATININE-BSD FRML MDRD: 92 ML/MIN/1.73SQ M
GLUCOSE SERPL-MCNC: 91 MG/DL (ref 65–140)
GLUCOSE SERPL-MCNC: 95 MG/DL (ref 65–140)
HCT VFR BLD AUTO: 39.1 % (ref 34.8–46.1)
HGB BLD-MCNC: 12.8 G/DL (ref 11.5–15.4)
INR PPP: 0.91 (ref 0.85–1.19)
MCH RBC QN AUTO: 28.8 PG (ref 26.8–34.3)
MCHC RBC AUTO-ENTMCNC: 32.7 G/DL (ref 31.4–37.4)
MCV RBC AUTO: 88 FL (ref 82–98)
PLATELET # BLD AUTO: 250 THOUSANDS/UL (ref 149–390)
PMV BLD AUTO: 8.9 FL (ref 8.9–12.7)
POTASSIUM SERPL-SCNC: 3.8 MMOL/L (ref 3.5–5.3)
PROTHROMBIN TIME: 12.5 SECONDS (ref 12.3–15)
RBC # BLD AUTO: 4.45 MILLION/UL (ref 3.81–5.12)
SODIUM SERPL-SCNC: 135 MMOL/L (ref 135–147)
WBC # BLD AUTO: 4.96 THOUSAND/UL (ref 4.31–10.16)

## 2025-03-18 PROCEDURE — 85027 COMPLETE CBC AUTOMATED: CPT

## 2025-03-18 PROCEDURE — 99284 EMERGENCY DEPT VISIT MOD MDM: CPT

## 2025-03-18 PROCEDURE — 85730 THROMBOPLASTIN TIME PARTIAL: CPT

## 2025-03-18 PROCEDURE — 80048 BASIC METABOLIC PNL TOTAL CA: CPT

## 2025-03-18 PROCEDURE — 80061 LIPID PANEL: CPT

## 2025-03-18 PROCEDURE — 85652 RBC SED RATE AUTOMATED: CPT

## 2025-03-18 PROCEDURE — 70496 CT ANGIOGRAPHY HEAD: CPT

## 2025-03-18 PROCEDURE — 82948 REAGENT STRIP/BLOOD GLUCOSE: CPT

## 2025-03-18 PROCEDURE — 86140 C-REACTIVE PROTEIN: CPT

## 2025-03-18 PROCEDURE — 83036 HEMOGLOBIN GLYCOSYLATED A1C: CPT

## 2025-03-18 PROCEDURE — 85610 PROTHROMBIN TIME: CPT

## 2025-03-18 PROCEDURE — 99285 EMERGENCY DEPT VISIT HI MDM: CPT | Performed by: EMERGENCY MEDICINE

## 2025-03-18 PROCEDURE — 70498 CT ANGIOGRAPHY NECK: CPT

## 2025-03-18 PROCEDURE — 36415 COLL VENOUS BLD VENIPUNCTURE: CPT

## 2025-03-18 RX ORDER — ATORVASTATIN CALCIUM 40 MG/1
40 TABLET, FILM COATED ORAL EVERY EVENING
Status: DISCONTINUED | OUTPATIENT
Start: 2025-03-18 | End: 2025-03-21 | Stop reason: HOSPADM

## 2025-03-18 RX ORDER — DORZOLAMIDE HYDROCHLORIDE AND TIMOLOL MALEATE 20; 5 MG/ML; MG/ML
1 SOLUTION/ DROPS OPHTHALMIC 2 TIMES DAILY
Status: DISCONTINUED | OUTPATIENT
Start: 2025-03-18 | End: 2025-03-19

## 2025-03-18 RX ORDER — BRIMONIDINE TARTRATE 2 MG/ML
1 SOLUTION/ DROPS OPHTHALMIC EVERY 12 HOURS SCHEDULED
Status: DISCONTINUED | OUTPATIENT
Start: 2025-03-18 | End: 2025-03-21 | Stop reason: HOSPADM

## 2025-03-18 RX ORDER — ASPIRIN 81 MG/1
81 TABLET, CHEWABLE ORAL DAILY
Status: DISCONTINUED | OUTPATIENT
Start: 2025-03-19 | End: 2025-03-21 | Stop reason: HOSPADM

## 2025-03-18 RX ORDER — METHOTREXATE 2.5 MG/1
15 TABLET ORAL WEEKLY
Status: DISCONTINUED | OUTPATIENT
Start: 2025-03-21 | End: 2025-03-21 | Stop reason: HOSPADM

## 2025-03-18 RX ORDER — LEVOTHYROXINE SODIUM 88 UG/1
88 TABLET ORAL
Status: DISCONTINUED | OUTPATIENT
Start: 2025-03-19 | End: 2025-03-21 | Stop reason: HOSPADM

## 2025-03-18 RX ORDER — LATANOPROST 50 UG/ML
1 SOLUTION/ DROPS OPHTHALMIC
Status: DISCONTINUED | OUTPATIENT
Start: 2025-03-18 | End: 2025-03-21 | Stop reason: HOSPADM

## 2025-03-18 RX ORDER — LORAZEPAM 2 MG/ML
0.5 INJECTION INTRAMUSCULAR
Status: COMPLETED | OUTPATIENT
Start: 2025-03-18 | End: 2025-03-20

## 2025-03-18 RX ORDER — ENOXAPARIN SODIUM 100 MG/ML
40 INJECTION SUBCUTANEOUS DAILY
Status: DISCONTINUED | OUTPATIENT
Start: 2025-03-19 | End: 2025-03-21 | Stop reason: HOSPADM

## 2025-03-18 RX ADMIN — LATANOPROST 1 DROP: 50 SOLUTION OPHTHALMIC at 22:23

## 2025-03-18 RX ADMIN — ATORVASTATIN CALCIUM 40 MG: 40 TABLET, FILM COATED ORAL at 21:26

## 2025-03-18 RX ADMIN — DORZOLAMIDE HYDROCHLORIDE AND TIMOLOL MALEATE 1 DROP: 20; 5 SOLUTION OPHTHALMIC at 22:23

## 2025-03-18 RX ADMIN — BRIMONIDINE TARTRATE 1 DROP: 2 SOLUTION/ DROPS OPHTHALMIC at 22:23

## 2025-03-18 RX ADMIN — IOHEXOL 85 ML: 350 INJECTION, SOLUTION INTRAVENOUS at 13:19

## 2025-03-18 NOTE — ED PROVIDER NOTES
Time reflects when diagnosis was documented in both MDM as applicable and the Disposition within this note       Time User Action Codes Description Comment    3/18/2025  3:22 PM Alfonso Morris Add [H54.61] Visual loss, right eye           ED Disposition       ED Disposition   Admit    Condition   Stable    Date/Time   Tue Mar 18, 2025  3:22 PM    Comment                  Assessment & Plan       Medical Decision Making  Patient is a 79-year-old female presenting with concern for central retinal artery occlusion.    Differential includes but not limited to worsening underlying macular edema, central retinal artery occlusion, CVA, metabolic derangement, less likely temporal arteritis.  Labs, CTA obtained.  No acute findings on workup.  Neurology consulted.    Patient admitted for stroke pathway.    Amount and/or Complexity of Data Reviewed  Labs: ordered.  Radiology: ordered.    Risk  Prescription drug management.  Decision regarding hospitalization.             Medications   levothyroxine tablet 88 mcg (88 mcg Oral Given 3/19/25 0639)   methotrexate tablet 15 mg (has no administration in time range)   atorvastatin (LIPITOR) tablet 40 mg (40 mg Oral Given 3/19/25 1741)   aspirin chewable tablet 81 mg (81 mg Oral Given 3/19/25 0829)   enoxaparin (LOVENOX) subcutaneous injection 40 mg (40 mg Subcutaneous Given 3/19/25 0829)   brimonidine tartrate 0.2 % ophthalmic solution 1 drop (1 drop Right Eye Given 3/19/25 0828)   latanoprost (XALATAN) 0.005 % ophthalmic solution 1 drop (1 drop Right Eye Given 3/18/25 2223)   dorzolamide-timolol (COSOPT) 2-0.5 % ophthalmic solution 1 drop (has no administration in time range)   iohexol (OMNIPAQUE) 350 MG/ML injection (MULTI-DOSE) 85 mL (85 mL Intravenous Given 3/18/25 1319)   LORazepam (ATIVAN) injection 0.5 mg (has no administration in time range)       ED Risk Strat Scores                    Identification of Seniors at Risk      Flowsheet Row Most Recent Value   (ISAR)  "Identification of Seniors at Risk    Before the illness or injury that brought you to the Emergency, did you need someone to help you on a regular basis? 0 Filed at: 03/18/2025 1134   In the last 24 hours, have you needed more help than usual? 0 Filed at: 03/18/2025 1134   Have you been hospitalized for one or more nights during the past 6 months? 0 Filed at: 03/18/2025 1134   In general, do you see well? 0 Filed at: 03/18/2025 1134   In general, do you have serious problems with your memory? 0 Filed at: 03/18/2025 1134   Do you take more than three different medications every day? 1 Filed at: 03/18/2025 1134   ISAR Score 1 Filed at: 03/18/2025 1134                SBIRT 20yo+      Flowsheet Row Most Recent Value   Initial Alcohol Screen: US AUDIT-C     1. How often do you have a drink containing alcohol? 0 Filed at: 03/18/2025 1134   2. How many drinks containing alcohol do you have on a typical day you are drinking?  0 Filed at: 03/18/2025 1134   3b. FEMALE Any Age, or MALE 65+: How often do you have 4 or more drinks on one occassion? 0 Filed at: 03/18/2025 1134   Audit-C Score 0 Filed at: 03/18/2025 1134   SUZIE: How many times in the past year have you...    Used an illegal drug or used a prescription medication for non-medical reasons? Never Filed at: 03/18/2025 1134                            History of Present Illness       Chief Complaint   Patient presents with    Eye Problem     Pt sent to ED from [facility]. Pt states \"she was sent over to be assessed for possible stroke causing issues w/ R eye.\" Pt states she has been having difficulties with this eye for the past couple months. Complains today a\of blurriness in her R eye. No other complaints today.        Past Medical History:   Diagnosis Date    Allergic 1952    Arthritis 2007    Cataract     Disease of thyroid gland     hypothyrodism     HL (hearing loss)     Hypertension     Lumbago     Pure hypercholesterolemia     Rheumatoid arthritis (HCC)     " Stenosis of rectum and anus       Past Surgical History:   Procedure Laterality Date    CATARACT EXTRACTION Bilateral     CERVIX SURGERY      HEMORROIDECTOMY      MAMMO (HISTORICAL)  2020    [facility]    MENISCECTOMY      [place] COLONOSCOPY FLX DX W/COLLJ SPEC WHEN PFRMD N/A 11/17/2017    Procedure: COLONOSCOPY;  Surgeon: Neyda Palencia MD;  Location:  GI LAB;  Service: Colorectal      Family History   Problem Relation Age of Onset    Breast cancer Mother 54    Thyroid disease Mother     Hearing loss Father     Heart disease Father     Prostate cancer Brother 69    Breast cancer Maternal Aunt 60    Prostate cancer Paternal Uncle 80    Endometrial cancer Cousin 58    No Known Problems Daughter     No Known Problems Maternal Grandmother     No Known Problems Maternal Grandfather     No Known Problems Paternal Grandmother     No Known Problems Paternal Grandfather     No Known Problems Daughter       Social History     Tobacco Use    Smoking status: Never    Smokeless tobacco: Never   Vaping Use    Vaping status: Never Used   Substance Use Topics    Alcohol use: Not Currently     Comment: 1 drink new years oren    Drug use: No      E-Cigarette/Vaping    E-Cigarette Use Never User       E-Cigarette/Vaping Substances    Nicotine No     THC No     CBD No     Flavoring No     Other No     Unknown No       I have reviewed and agree with the history as documented.     Patient is a 79-year-old female presenting from ophthalmology due to concern for central retinal artery occlusion.  Patient has had increased loss of vision in the right eye for a week to a week and a half.  She describes as black/blurry.  She has blurry vision in the right eye at baseline and receives injections every 11 weeks, but she noted significant worsening from that.  She denies any other associated symptoms.  She denies headache, lightheadedness, dizziness, chest pain, shortness of breath, abdominal pain, nausea, vomiting, numbness, tingling, or  weakness.  Looking through papers sent from ophthalmology, they do believe she has a central retinal vein occlusion but were concerned of the central retinal artery occlusion as well.  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        Review of Systems        Objective       ED Triage Vitals [03/18/25 1133]   Temperature Pulse Blood Pressure Respirations SpO2 Patient Position - Orthostatic VS   98 °F (36.7 °C) 84 161/90 18 98 % Sitting      Temp Source Heart Rate Source BP Location FiO2 (%) Pain Score    Oral Monitor Left arm -- No Pain      Vitals      Date and Time Temp Pulse SpO2 Resp BP Pain Score FACES Pain Rating User   03/20/25 2007 -- 87 95 % -- 148/77 -- -- DII   03/20/25 1905 -- -- -- -- -- No Pain -- HMG   03/20/25 1822 97.9 °F (36.6 °C) 103 97 % 18 121/66 -- -- DII   03/20/25 1509 98.4 °F (36.9 °C) 86 96 % 16 123/70 -- -- DII   03/20/25 1124 97.9 °F (36.6 °C) 74 99 % 18 139/69 -- -- DII   03/20/25 0916 -- -- -- -- -- No Pain -- MM   03/20/25 0915 -- -- -- -- -- No Pain -- KH   03/20/25 0746 98 °F (36.7 °C) 74 98 % 17 140/84 -- -- DII   03/20/25 0715 -- -- -- -- -- No Pain -- CL   03/20/25 0223 97.3 °F (36.3 °C) 60 98 % 15 136/65 -- -- DII   03/20/25 0009 98 °F (36.7 °C) 68 96 % 16 123/69 -- -- DII   03/1945 -- -- 97 % -- -- No Pain -- IZV   03/19/25 1900 98.3 °F (36.8 °C) 79 97 % 17 112/77 -- -- DII   03/19/25 1745 -- 75 97 % 18 160/80 -- -- EUNICE   03/19/25 1524 98.7 °F (37.1 °C) 73 96 % 18 138/88 -- -- EF   03/19/25 1345 -- 67 -- -- 151/75 -- -- JG   03/19/25 1344 -- -- -- -- 151/75 -- -- JG   03/19/25 1031 -- -- -- -- -- No Pain -- AMS   03/19/25 1029 98.7 °F (37.1 °C) 67 97 % 18 151/75 -- -- AMS   03/19/25 0900 -- 81 96 % 18 178/85 -- -- AS   03/19/25 0800 -- -- -- -- -- No Pain -- AS   03/19/25 0800 -- 73 96 % 19 154/72 -- -- AC   03/19/25 0715 -- 82 95 % 18 154/82 No Pain -- AS   03/19/25 0600 -- 77 96 % 18 -- -- -- AM   03/19/25 0435 -- 62 95 % 18 143/69  -- -- AM   03/19/25 0233 -- 52 95 % 16 106/58 -- -- AM   03/19/25 0031 -- 57 94 % 18 115/61 -- -- AM   03/18/25 2330 -- 74 97 % 18 146/76 -- -- AM   03/18/25 2222 -- 79 97 % 18 150/75 -- -- AM   03/18/25 2127 -- 80 97 % 18 112/61 -- -- AM   03/18/25 1800 -- 66 95 % 18 116/63 -- -- AM   03/18/25 1700 -- 75 95 % 19 131/69 -- -- AM   03/18/25 1600 -- 79 96 % 26 169/85 -- -- AM   03/18/25 1500 -- 79 96 % 20 167/76 -- -- AM   03/18/25 1400 -- 78 97 % 32 122/69 -- -- AM   03/18/25 1300 -- 75 95 % 18 147/76 -- -- KR   03/18/25 1215 -- 73 97 % 18 168/92 -- -- AM   03/18/25 1133 98 °F (36.7 °C) 84 98 % 18 161/90 No Pain -- AS            Physical Exam  Vitals and nursing note reviewed.   Constitutional:       General: She is not in acute distress.     Appearance: Normal appearance. She is not ill-appearing, toxic-appearing or diaphoretic.   HENT:      Head: Normocephalic and atraumatic.   Eyes:      Extraocular Movements: Extraocular movements intact.      Comments: Right eye dilated and poorly reactive-was dilated by ophthalmologist prior to arrival   Cardiovascular:      Rate and Rhythm: Normal rate and regular rhythm.      Heart sounds: Normal heart sounds.   Pulmonary:      Effort: Pulmonary effort is normal.      Breath sounds: Normal breath sounds.   Abdominal:      General: Abdomen is flat.      Palpations: Abdomen is soft.      Tenderness: There is no abdominal tenderness.   Musculoskeletal:         General: Normal range of motion.   Skin:     General: Skin is warm and dry.   Neurological:      General: No focal deficit present.      Mental Status: She is alert and oriented to person, place, and time.      Cranial Nerves: No cranial nerve deficit.      Sensory: No sensory deficit.      Motor: No weakness.         Results Reviewed       Procedure Component Value Units Date/Time    Basic metabolic panel [[id number]]  (Abnormal) Collected: 03/19/25 0437    Lab Status: Final result Specimen: Blood from Arm, Left Updated:  03/19/25 0634     Sodium 136 mmol/L      Potassium 3.7 mmol/L      Chloride 100 mmol/L      CO2 24 mmol/L      ANION GAP 12 mmol/L      BUN 11 mg/dL      Creatinine 0.50 mg/dL      Glucose 88 mg/dL      Calcium 8.8 mg/dL      eGFR 92 ml/min/1.73sq m     Narrative:      National Kidney Disease Foundation guidelines for Chronic Kidney Disease (CKD):     Stage 1 with normal or high GFR (GFR > 90 mL/min/1.73 square meters)    Stage 2 Mild CKD (GFR = 60-89 mL/min/1.73 square meters)    Stage 3A Moderate CKD (GFR = 45-59 mL/min/1.73 square meters)    Stage 3B Moderate CKD (GFR = 30-44 mL/min/1.73 square meters)    Stage 4 Severe CKD (GFR = 15-29 mL/min/1.73 square meters)    Stage 5 End Stage CKD (GFR <15 mL/min/1.73 square meters)  Note: GFR calculation is accurate only with a steady state creatinine    Danube draw [[id number]] Collected: 03/19/25 0437    Lab Status: Final result Specimen: Blood from Arm, Left Updated: 03/19/25 0601    Narrative:      The following orders were created for panel order Danube draw.  Procedure                               Abnormality         Status                     ---------                               -----------         ------                     Green / Yellow tube on hold[[id number]]                      Final result               Lavender Top 3 ml on hold[[id number]]                        Final result                 Please view results for these tests on the individual orders.    Lipid Panel with Direct LDL reflex [[id number]]  (Normal) Collected: 03/18/25 1220    Lab Status: Final result Specimen: Blood from Arm, Left Updated: 03/19/25 0543     Cholesterol 169 mg/dL      Triglycerides 102 mg/dL      HDL, Direct 62 mg/dL      LDL Calculated 87 mg/dL     Hemoglobin A1c w/EAG Estimation [[id number]]  (Abnormal) Collected: 03/18/25 1220    Lab Status: Final result Specimen: Blood from Arm, Left Updated: 03/19/25 0532     Hemoglobin A1C 6.0 %       mg/dl     CBC and Platelet  [[id number]]  (Normal) Collected: 03/19/25 0437    Lab Status: Final result Specimen: Blood from Arm, Left Updated: 03/19/25 0532     WBC 5.09 Thousand/uL      RBC 4.23 Million/uL      Hemoglobin 12.4 g/dL      Hematocrit 36.9 %      MCV 87 fL      MCH 29.3 pg      MCHC 33.6 g/dL      RDW 14.8 %      Platelets 243 Thousands/uL      MPV 9.0 fL     C-reactive protein [[id number]]  (Abnormal) Collected: 03/18/25 1220    Lab Status: Final result Specimen: Blood from Arm, Left Updated: 03/18/25 1737     CRP 15.0 mg/L     Sedimentation rate, automated [[id number]]  (Abnormal) Collected: 03/18/25 1654    Lab Status: Final result Specimen: Blood from Arm, Left Updated: 03/18/25 1707     Sed Rate 47 mm/hour     Basic metabolic panel [[id number]]  (Abnormal) Collected: 03/18/25 1220    Lab Status: Final result Specimen: Blood from Arm, Left Updated: 03/18/25 1256     Sodium 135 mmol/L      Potassium 3.8 mmol/L      Chloride 101 mmol/L      CO2 27 mmol/L      ANION GAP 7 mmol/L      BUN 12 mg/dL      Creatinine 0.50 mg/dL      Glucose 91 mg/dL      Calcium 8.9 mg/dL      eGFR 92 ml/min/1.73sq m     Narrative:      National Kidney Disease Foundation guidelines for Chronic Kidney Disease (CKD):     Stage 1 with normal or high GFR (GFR > 90 mL/min/1.73 square meters)    Stage 2 Mild CKD (GFR = 60-89 mL/min/1.73 square meters)    Stage 3A Moderate CKD (GFR = 45-59 mL/min/1.73 square meters)    Stage 3B Moderate CKD (GFR = 30-44 mL/min/1.73 square meters)    Stage 4 Severe CKD (GFR = 15-29 mL/min/1.73 square meters)    Stage 5 End Stage CKD (GFR <15 mL/min/1.73 square meters)  Note: GFR calculation is accurate only with a steady state creatinine    Protime-INR [[id number]]  (Normal) Collected: 03/18/25 1220    Lab Status: Final result Specimen: Blood from Arm, Left Updated: 03/18/25 1249     Protime 12.5 seconds      INR 0.91    Narrative:      INR Therapeutic Range    Indication                                             INR  Range      Atrial Fibrillation                                               2.0-3.0  Hypercoagulable State                                    2.0.2.3  Left Ventricular Asist Device                            2.0-3.0  Mechanical Heart Valve                                  -    Aortic(with afib, MI, embolism, HF, LA enlargement,    and/or coagulopathy)                                     2.0-3.0 (2.5-3.5)     Mitral                                                             2.5-3.5  Prosthetic/Bioprosthetic Heart Valve               2.0-3.0  Venous thromboembolism (VTE: VT, PE        2.0-3.0    APTT [[id number]]  (Normal) Collected: 03/18/25 1220    Lab Status: Final result Specimen: Blood from Arm, Left Updated: 03/18/25 1249     PTT 31 seconds     CBC and Platelet [[id number]]  (Normal) Collected: 03/18/25 1220    Lab Status: Final result Specimen: Blood from Arm, Left Updated: 03/18/25 1239     WBC 4.96 Thousand/uL      RBC 4.45 Million/uL      Hemoglobin 12.8 g/dL      Hematocrit 39.1 %      MCV 88 fL      MCH 28.8 pg      MCHC 32.7 g/dL      RDW 14.7 %      Platelets 250 Thousands/uL      MPV 8.9 fL     Fingerstick Glucose (POCT) [[id number]]  (Normal) Collected: 03/18/25 1223    Lab Status: Final result Specimen: Blood Updated: 03/18/25 1223     POC Glucose 95 mg/dl             MRI brain wo contrast   Final Interpretation by Zechariah Walsh MD (03/20 1619)      White matter changes suggestive of chronic microangiopathy. No acute intracranial pathology.      Workstation performed: [id number]         VAS temporal artery duplex   Final Interpretation by Bethany Bellamy MD (03/20 2008)      CTA head and neck with and without contrast   Final Interpretation by Zechariah Walsh MD (03/18 1340)      CT Brain:  No acute intracranial abnormality. Mild chronic microangiopathic changes.      CT Angiography: No large vessel occlusion in the head or neck.      Few scattered opacities in the left upper lobe, correlate  clinically for mild bronchopneumonia and recommend follow-up to resolution.            Workstation performed: [id number]             Procedures    ED Medication and Procedure Management   Prior to Admission Medications   Prescriptions Last Dose Informant Patient Reported? Taking?   Cholecalciferol (Vitamin D-3) 25 MCG (1000 UT) CAPS  Self Yes Yes   Sig: Take by mouth in the morning   Multiple Minerals-Vitamins (CITRACAL PLUS PO)  Self Yes Yes   Sig: Take by mouth 200 mg daily   amLODIPine (NORVASC) 5 mg tablet   No Yes   Sig: take 1 tablet by mouth once daily   dexAMETHasone (Ozurdex) 0.7 MG IMPL   Yes Yes   Si.7 mg by Intravitreal route every 3 (three) months Every 11 wks, started 10/25/2022   folic acid (FOLVITE) 1 mg tablet  Self Yes Yes   Sig: Take 800 mcg by mouth 4 (four) times a week    levothyroxine 88 mcg tablet   No Yes   Sig: take 1 tablet by mouth once daily   lisinopril (ZESTRIL) 20 mg tablet   No Yes   Sig: Take 1 tablet (20 mg total) by mouth daily   lovastatin (MEVACOR) 20 mg tablet   No Yes   Sig: Take 1 tablet (20 mg total) by mouth daily at bedtime   methotrexate 2.5 mg tablet 3/14/2025 Self Yes Yes   Sig: Take 15 mg by mouth once a week    mirtazapine (REMERON) 7.5 MG tablet   No Yes   Sig: take 1 tablet by mouth at bedtime   multivitamin (THERAGRAN) TABS  Self Yes No   Sig: Take 1 tablet by mouth 4 (four) times a week   timolol (TIMOPTIC) 0.25 % ophthalmic solution  Self Yes Yes   Sig: instill 1 drop into right eye twice a day      Facility-Administered Medications: None     Current Discharge Medication List        CONTINUE these medications which have NOT CHANGED    Details   amLODIPine (NORVASC) 5 mg tablet take 1 tablet by mouth once daily  Qty: 90 tablet, Refills: 1    Associated Diagnoses: Essential hypertension      Cholecalciferol (Vitamin D-3) 25 MCG (1000 UT) CAPS Take by mouth in the morning      dexAMETHasone (Ozurdex) 0.7 MG IMPL 0.7 mg by Intravitreal route every 3 (three)  months Every 11 wks, started 10/25/2022      folic acid (FOLVITE) 1 mg tablet Take 800 mcg by mouth 4 (four) times a week       levothyroxine 88 mcg tablet take 1 tablet by mouth once daily  Qty: 90 tablet, Refills: 1    Associated Diagnoses: Hypothyroidism, unspecified type      lisinopril (ZESTRIL) 20 mg tablet Take 1 tablet (20 mg total) by mouth daily  Qty: 90 tablet, Refills: 1    Associated Diagnoses: Essential hypertension      lovastatin (MEVACOR) 20 mg tablet Take 1 tablet (20 mg total) by mouth daily at bedtime  Qty: 90 tablet, Refills: 1    Associated Diagnoses: Mixed hyperlipidemia      methotrexate 2.5 mg tablet Take 15 mg by mouth once a week       mirtazapine (REMERON) 7.5 MG tablet take 1 tablet by mouth at bedtime  Qty: 90 tablet, Refills: 1    Associated Diagnoses: CALVIN (generalized anxiety disorder); Other insomnia      Multiple Minerals-Vitamins (CITRACAL PLUS PO) Take by mouth 200 mg daily      timolol (TIMOPTIC) 0.25 % ophthalmic solution instill 1 drop into right eye twice a day      multivitamin (THERAGRAN) TABS Take 1 tablet by mouth 4 (four) times a week           No discharge procedures on file.  ED SEPSIS DOCUMENTATION   Time reflects when diagnosis was documented in both MDM as applicable and the Disposition within this note       Time User Action Codes Description Comment    3/18/2025  3:22 PM Alfonso Morris Add [H54.61] Visual loss, right eye                  Alfonso Morris MD  03/20/25 2022

## 2025-03-18 NOTE — ED ATTENDING ATTESTATION
"I saw and evaluated the patient. I have discussed the patient with the resident physician and agree with the resident's findings, assessment and plan as documented in the resident physician's note, unless otherwise documented below. All available laboratory and imaging studies were reviewed by myself.  I was present for key portions of any procedure(s) performed by the resident and I was immediately available to provide assistance.     I agree with the current assessment done in the Emergency Department. I have conducted an independent evaluation of this patient    Final Diagnosis:  1. Visual loss, right eye            Chief Complaint   Patient presents with    Eye Problem     Pt sent to ED from [facility]. Pt states \"she was sent over to be assessed for possible stroke causing issues w/ R eye.\" Pt states she has been having difficulties with this eye for the past couple months. Complains today a\of blurriness in her R eye. No other complaints today.      This is a 79 y.o. female with a history of vitreous hemorrhage, posterior uveitis, hypertension, presenting for evaluation of right eye vision change. Over the last week vision in this eye has become progressively worse with general blurriness. Went to ophtho today and she was diagnosed with CRVO with concerns for CRAO as well.     PMH:   has a past medical history of Allergic (1952), Arthritis (2007), Cataract, Disease of thyroid gland, HL (hearing loss), Hypertension, Lumbago, Pure hypercholesterolemia, Rheumatoid arthritis (HCC), and Stenosis of rectum and anus.    PSH:   has a past surgical history that includes Cervix surgery; Cataract extraction (Bilateral); Hemorroidectomy; Meniscectomy; pr colonoscopy flx dx w/collj spec when pfrmd (N/A, 11/17/2017); and Mammo (historical) (2020).    Social:  Social History     Substance and Sexual Activity   Alcohol Use Not Currently    Comment: 1 drink new years oren     Social History     Tobacco Use   Smoking Status Never "   Smokeless Tobacco Never     Social History     Substance and Sexual Activity   Drug Use No     PE:  Vitals:    03/18/25 1400 03/18/25 1500 03/18/25 1600 03/18/25 1700   BP: 122/69 167/76 169/85 131/69   BP Location: Left arm Left arm Left arm Left arm   Pulse: 78 79 79 75   Resp: (!) 32 20 (!) 26 19   Temp:       TempSrc:       SpO2: 97% 96% 96% 95%   Weight:       Height:               Physical exam:  GENERAL APPEARANCE: Appears comfortable, no acute distress, calm and cooperative   NEURO: GCS 15, cranial nerves grossly intact, clear fluent speech, no facial asymmetry. CN II-XII intact. 5/5 strength in all four extremities. No pronator drift. Normal finger nose finger. Normal heel to shin. No dysdiadochokinesis. Negative Romberg. Normal gait.  HEENT: Normocephalic, atraumatic, moist mucous membranes. Dilated right pupil after receiving eye drops by ophthalmology. EOMI.   Neck: Supple, full ROM  CV: RRR, no murmurs, rubs, or gallops  LUNGS: CTAB, no wheezing, rales, or rhonchi  GI: Abdomen soft, non-tender, no rebound or guarding   MSK: Extremities non-tender, no pitting edema  SKIN: Warm and dry      Assessment and plan: This is a 79 y.o. female with a history of vitreous hemorrhage, posterior uveitis, hypertension, presenting for evaluation of right eye vision change. Within ddx consider CRVO, CRAO, CVA, carotid stenosis, complex migraine, metabolic abnormality. Will obtain CTA to evaluate.     CT/CTA findings below. Patient has not had clinical signs of pneumonia so will not treat at this time. Will consult neurology.        Final assessment: Neurology accepts patient for admission for stroke pathway. Remains stable throughout ED course.     Code Status: No Order  Advance Directive and Living Will:      Power of :    POLST:      Medications   iohexol (OMNIPAQUE) 350 MG/ML injection (MULTI-DOSE) 85 mL (85 mL Intravenous Given 3/18/25 1319)     CTA head and neck with and without contrast   Final Result       CT Brain:  No acute intracranial abnormality. Mild chronic microangiopathic changes.      CT Angiography: No large vessel occlusion in the head or neck.      Few scattered opacities in the left upper lobe, correlate clinically for mild bronchopneumonia and recommend follow-up to resolution.            Workstation performed: [id number]           Orders Placed This Encounter   Procedures    CTA head and neck with and without contrast    Basic metabolic panel    CBC and Platelet    Protime-INR    APTT    Sedimentation rate, automated    C-reactive protein    Continuous cardiac monitoring    Continuous pulse oximetry    Weigh patient and record    Insert peripheral IV    Nursing dysphagia Screen    Nasal cannula oxygen    Fingerstick Glucose (POCT)    Contact and airborne isolation status    INPATIENT ADMISSION     Labs Reviewed   BASIC METABOLIC PANEL - Abnormal       Result Value Ref Range Status    Sodium 135  135 - 147 mmol/L Final    Potassium 3.8  3.5 - 5.3 mmol/L Final    Chloride 101  96 - 108 mmol/L Final    CO2 27  21 - 32 mmol/L Final    ANION GAP 7  4 - 13 mmol/L Final    BUN 12  5 - 25 mg/dL Final    Creatinine 0.50 (*) 0.60 - 1.30 mg/dL Final    Comment: Standardized to IDMS reference method    Glucose 91  65 - 140 mg/dL Final    Comment: If the patient is fasting, the ADA then defines impaired fasting glucose as > 100 mg/dL and diabetes as > or equal to 123 mg/dL.    Calcium 8.9  8.4 - 10.2 mg/dL Final    eGFR 92  ml/min/1.73sq m Final    Narrative:     National Kidney Disease Foundation guidelines for Chronic Kidney Disease (CKD):     Stage 1 with normal or high GFR (GFR > 90 mL/min/1.73 square meters)    Stage 2 Mild CKD (GFR = 60-89 mL/min/1.73 square meters)    Stage 3A Moderate CKD (GFR = 45-59 mL/min/1.73 square meters)    Stage 3B Moderate CKD (GFR = 30-44 mL/min/1.73 square meters)    Stage 4 Severe CKD (GFR = 15-29 mL/min/1.73 square meters)    Stage 5 End Stage CKD (GFR <15 mL/min/1.73 square  meters)  Note: GFR calculation is accurate only with a steady state creatinine   SEDIMENTATION RATE - Abnormal    Sed Rate 47 (*) 0 - 29 mm/hour Final   C-REACTIVE PROTEIN - Abnormal    CRP 15.0 (*) <3.0 mg/L Final   CBC AND PLATELET - Normal    WBC 4.96  4.31 - 10.16 Thousand/uL Final    RBC 4.45  3.81 - 5.12 Million/uL Final    Hemoglobin 12.8  11.5 - 15.4 g/dL Final    Hematocrit 39.1  34.8 - 46.1 % Final    MCV 88  82 - 98 fL Final    MCH 28.8  26.8 - 34.3 pg Final    MCHC 32.7  31.4 - 37.4 g/dL Final    RDW 14.7  11.6 - 15.1 % Final    Platelets 250  149 - 390 Thousands/uL Final    MPV 8.9  8.9 - 12.7 fL Final   PROTIME-INR - Normal    Protime 12.5  12.3 - 15.0 seconds Final    INR 0.91  0.85 - 1.19 Final    Narrative:     INR Therapeutic Range    Indication                                             INR Range      Atrial Fibrillation                                               2.0-3.0  Hypercoagulable State                                    2.0.2.3  Left Ventricular Asist Device                            2.0-3.0  Mechanical Heart Valve                                  -    Aortic(with afib, MI, embolism, HF, LA enlargement,    and/or coagulopathy)                                     2.0-3.0 (2.5-3.5)     Mitral                                                             2.5-3.5  Prosthetic/Bioprosthetic Heart Valve               2.0-3.0  Venous thromboembolism (VTE: VT, PE        2.0-3.0   APTT - Normal    PTT 31  23 - 34 seconds Final    Comment: Therapeutic Heparin Range =  60-90 seconds   POCT GLUCOSE - Normal    POC Glucose 95  65 - 140 mg/dl Final         Time reflects when diagnosis was documented in both MDM as applicable and the Disposition within this note       Time User Action Codes Description Comment    3/18/2025  3:22 PM Alfonso Morris Add [H54.61] Visual loss, right eye           ED Disposition       ED Disposition   Admit    Condition   Stable    Date/Time   Tue Mar 18, 2025  3:22 PM     "Comment                  Follow-up Information    None       Patient's Medications   Discharge Prescriptions    No medications on file     No discharge procedures on file.  Prior to Admission Medications   Prescriptions Last Dose Informant Patient Reported? Taking?   Cholecalciferol (Vitamin D-3) 25 MCG (1000 UT) CAPS  Self Yes No   Sig: Take by mouth in the morning   Multiple Minerals-Vitamins (CITRACAL PLUS PO)  Self Yes No   Sig: Take by mouth 200 mg daily   amLODIPine (NORVASC) 5 mg tablet   No No   Sig: take 1 tablet by mouth once daily   dexAMETHasone (Ozurdex) 0.7 MG IMPL   Yes No   Si.7 mg by Intravitreal route every 3 (three) months Every 11 wks, started 10/25/2022   folic acid (FOLVITE) 1 mg tablet  Self Yes No   Sig: Take 800 mcg by mouth 4 (four) times a week    levothyroxine 88 mcg tablet   No No   Sig: take 1 tablet by mouth once daily   lisinopril (ZESTRIL) 20 mg tablet   No No   Sig: Take 1 tablet (20 mg total) by mouth daily   lovastatin (MEVACOR) 20 mg tablet   No No   Sig: Take 1 tablet (20 mg total) by mouth daily at bedtime   methotrexate 2.5 mg tablet  Self Yes No   Sig: Take 15 mg by mouth once a week    mirtazapine (REMERON) 7.5 MG tablet   No No   Sig: take 1 tablet by mouth at bedtime   multivitamin (THERAGRAN) TABS  Self Yes No   Sig: Take 1 tablet by mouth 4 (four) times a week   timolol (TIMOPTIC) 0.25 % ophthalmic solution  Self Yes No   Sig: instill 1 drop into right eye twice a day      Facility-Administered Medications: None         Portions of the record may have been created with voice recognition software. Occasional wrong word or \"sound a like\" substitutions may have occurred due to the inherent limitations of voice recognition software. Read the chart carefully and recognize, using context, where substitutions have occurred.    Electronically signed by:  Eveline Zavala      "

## 2025-03-18 NOTE — ASSESSMENT & PLAN NOTE
79 y.o. female with pertinent PMH of hypertension, hyperlipidemia, RA, hypothyroidism.  She states that she noted a decrease in her visual acuity over the past week and a half.  Was evaluated her ophthalmologist today who noted that she has central retinal vein occlusion with possible central retinal artery occlusion.  Her ophthalmologist also noted macular edema of the right eye.  She recommended evaluation at the ED for stroke rule out.  Initial BP Blood Pressure: 161/90, FSBG POC Glucose: 95. CTA H/N no acute intracranial abnormalities, no LVO or high-grade stenosis.   - Home Antiplatelet /Anticoagulants PTA: No antiplatelet or anticoagulants  - Stroke risk factors: HTN and HLD  - Prior Stroke Hx: none  - Past Neurological Hx: no neurological problems    Workup:  - CTA: no acute intracranial abnormalities, no LVO or high-grade stenosis.    - MRI: pending  - TTE: pend  - Labs: Hemoglobin A1c 5.8 (12/26/2024), LDL 98 (12/26/2024)    Impression: 79 y.o. female with history of hypertension, hyperlipidemia, RA, hypothyroidism who presented with decreased acuity of vision of the right eye and diagnosed with central retinal vein occlusion by her ophthalmologist.     Plan: Discussed plan with neurology attending, Dr. Tillman  Admit along the stroke pathway with telemetry monitoring  Strict NPO prior to dysphagia screen   Frequent neuro checks per protocol. If there is any acute changes in exam, please obtain stat CT head and notify neurology team  BP Goals: Permissive Hypertension - SBP < 220/120 for first 24 hrs, followed by gradual reduction if neurologically stable  and Normotension of <130/80  Antiplatelet agents: Aspirin 81 mg daily.  Anticoagulation: None  Statin: Change home statin therapy from lovastatin 20 mg daily to Atorvastatin 40 mg QHS  Labs: Hgb A1c, LDL   Brain Imaging: Obtain MRI brain without contrast  TTE, pending  Euthermic/Euglycemic  DVT PPx: Enoxaparin (Lovenox), SCDs  Per OP ophthalmology for  ocular hypertension start eye drops Cosopt BID, Brimonidine BID and latanoprost qhs  PT/OT/ST/PMR evaluations when able  Stroke education and counseling  Rest of other care per primary team appreciated    Disposition: PT Recommendations -

## 2025-03-18 NOTE — H&P
H&P - Neurology   Name: Sabina Trotter 79 y.o. female I MRN: [id number]  Unit/Bed#: ED 02 I Date of Admission: 3/18/2025   Date of Service: 3/18/2025 I Hospital Day: 0     Assessment & Plan  Central retinal vein occlusion of right eye  79 y.o. female with pertinent PMH of hypertension, hyperlipidemia, RA, hypothyroidism.  She states that she noted a decrease in her visual acuity over the past week and a half.  Was evaluated her ophthalmologist today who noted that she has central retinal vein occlusion with possible central retinal artery occlusion.  Her ophthalmologist also noted macular edema of the right eye.  She recommended evaluation at the ED for stroke rule out.  Initial BP Blood Pressure: 161/90, FSBG POC Glucose: 95. CTA H/N no acute intracranial abnormalities, no LVO or high-grade stenosis.   - Home Antiplatelet /Anticoagulants PTA: No antiplatelet or anticoagulants  - Stroke risk factors: HTN and HLD  - Prior Stroke Hx: none  - Past Neurological Hx: no neurological problems    Workup:  - CTA: no acute intracranial abnormalities, no LVO or high-grade stenosis.    - MRI: pending  - TTE: pend  - Labs: Hemoglobin A1c 5.8 (12/26/2024), LDL 98 (12/26/2024)    Impression: 79 y.o. female with history of hypertension, hyperlipidemia, RA, hypothyroidism who presented with decreased acuity of vision of the right eye and diagnosed with central retinal vein occlusion by her ophthalmologist.     Plan: Discussed plan with neurology attending, Dr. Tillman  Admit along the stroke pathway with telemetry monitoring  Strict NPO prior to dysphagia screen   Frequent neuro checks per protocol. If there is any acute changes in exam, please obtain stat CT head and notify neurology team  BP Goals: Permissive Hypertension - SBP < 220/120 for first 24 hrs, followed by gradual reduction if neurologically stable  and Normotension of <130/80  Antiplatelet agents: Aspirin 81 mg daily.  Anticoagulation: None  Statin: Change home statin  therapy from lovastatin 20 mg daily to Atorvastatin 40 mg QHS  Labs: Hgb A1c, LDL   Brain Imaging: Obtain MRI brain without contrast  TTE, pending  Euthermic/Euglycemic  DVT PPx: Enoxaparin (Lovenox), SCDs  Per OP ophthalmology for ocular hypertension start eye drops Cosopt BID, Brimonidine BID and latanoprost qhs  PT/OT/ST/PMR evaluations when able  Stroke education and counseling  Rest of other care per primary team appreciated    Disposition: PT Recommendations -      Hypothyroidism due to acquired atrophy of thyroid  Continue PTA levothyroxine  Mixed hyperlipidemia  Switched PTA lovastatin to Lipitor 40 mg per formulary  Essential hypertension  Holding PTA amlodipine, lisinopril  Rheumatoid arthritis 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    History of Present Illness   Sabina Trotter is a 79 y.o. female with past medical history of hyperlipidemia, hypothyroidism, hypertension, RA who presents with decreased visual acuity of the right eye for the past week and a half.  She states that she has had decreased visual acuity in the right eye for at least the past year however an acute worsening at that time.  She also has a history of macular edema of the right eye as well as a prior vitreous hemorrhage status post vitrectomy, posterior uveitis and ocular hypertension.  She was evaluated by her ophthalmologist who diagnosed her with central retinal vein occlusion with possible central retinal artery occlusion as well.  She was advised to go to the ED for neurology workup to rule out stroke.  She denies any pain with this vision disturbance.  She denies any headache, dizziness, associated weakness, numbness or tingling, gait instability.    Review of Systems   negative  Medical History Review: I have reviewed the patient's PMH, PSH, Social History, Family History, Meds,  and Allergies     Objective :  Temp:  [98 °F (36.7 °C)] 98 °F (36.7 °C)  HR:  [73-84] 79  BP: (122-169)/(69-92) 169/85  Resp:  [18-32] 26  SpO2:  [95 %-98 %] 96 %  O2 Device: None (Room air)    Physical Exam  Constitutional:       General: She is not in acute distress.     Appearance: Normal appearance. She is not ill-appearing.   HENT:      Head: Normocephalic.      Mouth/Throat:      Mouth: Mucous membranes are moist.   Eyes:      Extraocular Movements: Extraocular movements intact.      Conjunctiva/sclera: Conjunctivae normal.   Cardiovascular:      Rate and Rhythm: Normal rate and regular rhythm.      Pulses: Normal pulses.      Heart sounds: Normal heart sounds.   Pulmonary:      Effort: Pulmonary effort is normal. No respiratory distress.      Breath sounds: Normal breath sounds.   Abdominal:      General: Abdomen is flat.      Palpations: Abdomen is soft.      Tenderness: There is no abdominal tenderness.   Neurological:      Motor: Motor strength is normal.     Coordination: Coordination is intact.      Deep Tendon Reflexes: Reflexes are normal and symmetric.   Psychiatric:         Speech: Speech normal.   Neurological Exam  Mental Status  Awake, alert and oriented to person, place and time. Oriented to person, place, time and situation. Speech is normal. Language is fluent with no aphasia.    Cranial Nerves  CN II: Right visual acuity: Counts fingers. Visual fields full to confrontation.  CN III, IV, VI: Extraocular movements intact bilaterally. R pupil dilated from eye exam earlier today, nonreactive.  CN V: Facial sensation is normal.  CN VII: Full and symmetric facial movement.  CN VIII:  Right: Hearing is decreased.  Left: Hearing is decreased.  CN IX, X: Palate elevates symmetrically  CN XI: Shoulder shrug strength is normal.  CN XII: Tongue midline without atrophy or fasciculations.    Motor   Strength is 5/5 throughout all four extremities.    Sensory  Light touch is normal in upper and lower  extremities.     Reflexes  Deep tendon reflexes are 2+ and symmetric in all four extremities.    Coordination    Finger-to-nose, rapid alternating movements and heel-to-shin normal bilaterally without dysmetria.        Lab Results: I have reviewed the following results:I have personally reviewed pertinent reports.    Recent Labs     03/18/25  1220   WBC 4.96   HGB 12.8   HCT 39.1      SODIUM 135   K 3.8      CO2 27   BUN 12   CREATININE 0.50*   GLUC 91     Imaging Results Review: I personally reviewed the following image studies in PACS and associated radiology reports: CT head. My interpretation of the radiology images/reports is: As above.  Other Study Results Review: No additional pertinent studies reviewed.    VTE Prophylaxis: Enoxaparin (Lovenox)

## 2025-03-19 ENCOUNTER — APPOINTMENT (INPATIENT)
Dept: NON INVASIVE DIAGNOSTICS | Facility: HOSPITAL | Age: 80
DRG: 123 | End: 2025-03-19
Payer: MEDICARE

## 2025-03-19 LAB
ANION GAP SERPL CALCULATED.3IONS-SCNC: 12 MMOL/L (ref 4–13)
AORTIC ROOT: 2.8 CM
AORTIC VALVE MEAN VELOCITY: 8.2 M/S
ASCENDING AORTA: 2.9 CM
AV LVOT MEAN GRADIENT: 3 MMHG
AV LVOT PEAK GRADIENT: 6 MMHG
AV MEAN PRESS GRAD SYS DOP V1V2: 3 MMHG
AV PEAK GRADIENT: 6 MMHG
AV VELOCITY RATIO: 0.9
AV VMAX SYS DOP: 1.23 M/S
BSA FOR ECHO PROCEDURE: 1.54 M2
BUN SERPL-MCNC: 11 MG/DL (ref 5–25)
CALCIUM SERPL-MCNC: 8.8 MG/DL (ref 8.4–10.2)
CHLORIDE SERPL-SCNC: 100 MMOL/L (ref 96–108)
CHOLEST SERPL-MCNC: 169 MG/DL (ref ?–200)
CO2 SERPL-SCNC: 24 MMOL/L (ref 21–32)
CREAT SERPL-MCNC: 0.5 MG/DL (ref 0.6–1.3)
DOP CALC AO VTI: 28.15 CM
DOP CALC LVOT PEAK VEL VTI: 25.39 CM
DOP CALC LVOT PEAK VEL: 1.19 M/S
E WAVE DECELERATION TIME: 273 MS
E/A RATIO: 0.74
ERYTHROCYTE [DISTWIDTH] IN BLOOD BY AUTOMATED COUNT: 14.8 % (ref 11.6–15.1)
EST. AVERAGE GLUCOSE BLD GHB EST-MCNC: 126 MG/DL
FRACTIONAL SHORTENING: 30 (ref 28–44)
GFR SERPL CREATININE-BSD FRML MDRD: 92 ML/MIN/1.73SQ M
GLUCOSE SERPL-MCNC: 88 MG/DL (ref 65–140)
HBA1C MFR BLD: 6 %
HCT VFR BLD AUTO: 36.9 % (ref 34.8–46.1)
HDLC SERPL-MCNC: 62 MG/DL
HGB BLD-MCNC: 12.4 G/DL (ref 11.5–15.4)
HOLD SPECIMEN: NORMAL
HOLD SPECIMEN: NORMAL
INTERVENTRICULAR SEPTUM IN DIASTOLE (PARASTERNAL SHORT AXIS VIEW): 0.9 CM
INTERVENTRICULAR SEPTUM: 0.9 CM (ref 0.6–1.1)
LAAS-AP2: 17 CM2
LAAS-AP4: 15 CM2
LDLC SERPL CALC-MCNC: 87 MG/DL (ref 0–100)
LEFT ATRIUM SIZE: 3.9 CM
LEFT ATRIUM VOLUME (MOD BIPLANE): 43 ML
LEFT ATRIUM VOLUME INDEX (MOD BIPLANE): 27.7 ML/M2
LEFT INTERNAL DIMENSION IN SYSTOLE: 2.8 CM (ref 2.1–4)
LEFT VENTRICULAR INTERNAL DIMENSION IN DIASTOLE: 4 CM (ref 3.5–6)
LEFT VENTRICULAR POSTERIOR WALL IN END DIASTOLE: 0.8 CM
LEFT VENTRICULAR STROKE VOLUME: 41 ML
LV EF US.2D.A4C+ESTIMATED: 73 %
LVSV (TEICH): 41 ML
MCH RBC QN AUTO: 29.3 PG (ref 26.8–34.3)
MCHC RBC AUTO-ENTMCNC: 33.6 G/DL (ref 31.4–37.4)
MCV RBC AUTO: 87 FL (ref 82–98)
MV E'TISSUE VEL-SEP: 7 CM/S
MV PEAK A VEL: 1.01 M/S
MV PEAK E VEL: 75 CM/S
MV STENOSIS PRESSURE HALF TIME: 79 MS
MV VALVE AREA P 1/2 METHOD: 2.78
PLATELET # BLD AUTO: 243 THOUSANDS/UL (ref 149–390)
PMV BLD AUTO: 9 FL (ref 8.9–12.7)
POTASSIUM SERPL-SCNC: 3.7 MMOL/L (ref 3.5–5.3)
RA PRESSURE ESTIMATED: 3 MMHG
RBC # BLD AUTO: 4.23 MILLION/UL (ref 3.81–5.12)
RIGHT ATRIAL 2D VOLUME: 32 ML
RIGHT ATRIUM AREA SYSTOLE A4C: 14.2 CM2
RIGHT VENTRICLE ID DIMENSION: 3.2 CM
RV PSP: 25 MMHG
SL CV LEFT ATRIUM LENGTH A2C: 4.8 CM
SL CV LV EF: 60
SL CV PED ECHO LEFT VENTRICLE DIASTOLIC VOLUME (MOD BIPLANE) 2D: 72 ML
SL CV PED ECHO LEFT VENTRICLE SYSTOLIC VOLUME (MOD BIPLANE) 2D: 31 ML
SODIUM SERPL-SCNC: 136 MMOL/L (ref 135–147)
TR MAX PG: 22 MMHG
TR PEAK VELOCITY: 2.4 M/S
TRICUSPID ANNULAR PLANE SYSTOLIC EXCURSION: 2 CM
TRICUSPID VALVE PEAK REGURGITATION VELOCITY: 2.35 M/S
TRIGL SERPL-MCNC: 102 MG/DL (ref ?–150)
WBC # BLD AUTO: 5.09 THOUSAND/UL (ref 4.31–10.16)

## 2025-03-19 PROCEDURE — 93306 TTE W/DOPPLER COMPLETE: CPT | Performed by: STUDENT IN AN ORGANIZED HEALTH CARE EDUCATION/TRAINING PROGRAM

## 2025-03-19 PROCEDURE — 80048 BASIC METABOLIC PNL TOTAL CA: CPT

## 2025-03-19 PROCEDURE — 93306 TTE W/DOPPLER COMPLETE: CPT

## 2025-03-19 PROCEDURE — 85027 COMPLETE CBC AUTOMATED: CPT

## 2025-03-19 PROCEDURE — 36415 COLL VENOUS BLD VENIPUNCTURE: CPT | Performed by: PSYCHIATRY & NEUROLOGY

## 2025-03-19 RX ORDER — DORZOLAMIDE HYDROCHLORIDE AND TIMOLOL MALEATE 20; 5 MG/ML; MG/ML
1 SOLUTION/ DROPS OPHTHALMIC EVERY 12 HOURS SCHEDULED
Status: DISCONTINUED | OUTPATIENT
Start: 2025-03-19 | End: 2025-03-21 | Stop reason: HOSPADM

## 2025-03-19 RX ADMIN — DORZOLAMIDE HYDROCHLORIDE AND TIMOLOL MALEATE 1 DROP: 20; 5 SOLUTION OPHTHALMIC at 08:33

## 2025-03-19 RX ADMIN — ASPIRIN 81 MG: 81 TABLET, CHEWABLE ORAL at 08:29

## 2025-03-19 RX ADMIN — LEVOTHYROXINE SODIUM 88 MCG: 88 TABLET ORAL at 06:39

## 2025-03-19 RX ADMIN — BRIMONIDINE TARTRATE 1 DROP: 2 SOLUTION/ DROPS OPHTHALMIC at 08:28

## 2025-03-19 RX ADMIN — LATANOPROST 1 DROP: 50 SOLUTION OPHTHALMIC at 22:32

## 2025-03-19 RX ADMIN — BRIMONIDINE TARTRATE 1 DROP: 2 SOLUTION/ DROPS OPHTHALMIC at 22:32

## 2025-03-19 RX ADMIN — DORZOLAMIDE HYDROCHLORIDE AND TIMOLOL MALEATE 1 DROP: 20; 5 SOLUTION OPHTHALMIC at 22:32

## 2025-03-19 RX ADMIN — ATORVASTATIN CALCIUM 40 MG: 40 TABLET, FILM COATED ORAL at 17:41

## 2025-03-19 RX ADMIN — ENOXAPARIN SODIUM 40 MG: 40 INJECTION SUBCUTANEOUS at 08:29

## 2025-03-19 NOTE — ED NOTES
Pt ambulatory to restroom w/o assistance. Gait steady and appropriate.      Cesar Bernstein IV, RN  03/18/25 6256

## 2025-03-19 NOTE — CASE MANAGEMENT
Case Management Assessment & Discharge Planning Note    Patient name Sabina Trotter  Location ED 02/ED 02 MRN [id number]  : 1945 Date 3/19/2025       Current Admission Date: 3/18/2025  Current Admission Diagnosis:Central retinal vein occlusion of right eye   Patient Active Problem List    Diagnosis Date Noted Date Diagnosed    Central retinal vein occlusion of right eye 2025     Varicose veins of bilateral lower extremities with other complications 2023     Essential hypertension 11/15/2022     Epistaxis 05/10/2022     Hypothyroidism due to acquired atrophy of thyroid 10/18/2021     Mixed hyperlipidemia 10/18/2021     Rheumatoid arthritis of multiple sites with negative rheumatoid factor (HCC) 10/18/2021     Dense breast 2019       LOS (days): 1  Geometric Mean LOS (GMLOS) (days): 2.1  Days to GMLOS:1.4     OBJECTIVE:    Risk of Unplanned Readmission Score: 7.53     Current admission status: Inpatient    Preferred Pharmacy:   [id number] - [address] [address]  Phone: [phone] Fax: [phone]    Primary Care Provider: Renetta Myrick MD    Primary Insurance: MEDICARE  Secondary Insurance: BLUE CROSS    ASSESSMENT:  Active Health Care Proxies       Nelson Trotter Health Care Representative - Spouse   Primary Phone: [phone] (Home)                 Advance Directives  Does patient have a Health Care POA?: Yes  Does patient have Advance Directives?: Yes  Primary Contact: Nelson Trtoter (Spouse)    Patient Information  Admitted from:: Home  Mental Status: Alert  During Assessment patient was accompanied by: Not accompanied during assessment  Assessment information provided by:: Patient  Primary Caregiver: Self  Support Systems: Spouse/significant other, Family members  Home entry access options. Select all that apply.: Stairs  Number of steps to enter home.: 3  Type of Current Residence: [facility]  Living Arrangements: Lives  w/ Spouse/significant other    Activities of Daily Living Prior to Admission  Functional Status: Independent  Completes ADLs independently?: Yes  Ambulates independently?: Yes  Does patient use assisted devices?: No  Does patient currently own DME?: No  Does patient have a history of Outpatient Therapy (PT/OT)?: No  Does the patient have a history of Short-Term Rehab?: No  Does patient have a history of HHC?: No  Does patient currently have HHC?: No    Patient Information Continued  Does patient have prescription coverage?: Yes  Can the patient afford their medications and any related supplies (such as glucometers or test strips)?: Yes  Does patient have a history of substance abuse?: No  Does patient have a history of Mental Health Diagnosis?: No    Means of Transportation  Means of Transport to App:: Drives Self          DISCHARGE DETAILS:    Discharge planning discussed with:: Pt at bedside    Other Referral/Resources/Interventions Provided:  Referral Comments: CM intake assessment completed with pt at bedside. Pt resides with her spouse in a ranch style home with 3-4 LESLY. Pt reports to be IPTA with no use of DME or STR/HHC hx. Pt still drives and states her car is currently in the parking lot here at [facility]. Pt plans to drive herself home on discharge pending further after care needs and if cleared to do so. After care needs pending at this time. CM team will continue to follow and remain available for discharge coordination.

## 2025-03-19 NOTE — ASSESSMENT & PLAN NOTE
79 y.o. female with pertinent PMH of hypertension, hyperlipidemia, RA, hypothyroidism.  She states that she noted a decrease in her visual acuity over the past week and a half.  Was evaluated her ophthalmologist today who noted that she has central retinal vein occlusion with possible central retinal artery occlusion.  Her ophthalmologist also noted macular edema of the right eye.  She recommended evaluation at the ED for stroke rule out.  Initial BP Blood Pressure: 161/90, FSBG POC Glucose: 95. CTA H/N no acute intracranial abnormalities, no LVO or high-grade stenosis.   - Home Antiplatelet /Anticoagulants PTA: No antiplatelet or anticoagulants  - Stroke risk factors: HTN and HLD  - Prior Stroke Hx: none  - Past Neurological Hx: no neurological problems    Workup:  - CTA: no acute intracranial abnormalities, no LVO or high-grade stenosis.    - MRI: pending  - TTE: pend  - Labs: Hemoglobin A1c 6.0 (3/18/2025), LDL 87 (3/18/2025), ESR 47, CRP 15    Impression: 79 y.o. female with history of hypertension, hyperlipidemia, RA, hypothyroidism who presented with decreased acuity of vision of the right eye and diagnosed with central retinal vein occlusion by her ophthalmologist who performed anterior chamber paracentesis, R eye IOP 39.    Plan: Discussed plan with neurology attending, Dr. Tillman  Admit along the stroke pathway with telemetry monitoring  Strict NPO prior to dysphagia screen   Frequent neuro checks per protocol. If there is any acute changes in exam, please obtain stat CT head and notify neurology team  BP Goals: Permissive Hypertension - SBP < 220/120 for first 24 hrs, followed by gradual reduction if neurologically stable  and Normotension of <130/80  Antiplatelet agents: Aspirin 81 mg daily.  Anticoagulation: None  Statin: Change home statin therapy from lovastatin 20 mg daily to Atorvastatin 40 mg QHS  Brain Imaging: Obtain MRI brain without contrast  Temporal artery duplex  TTE,  pending  Euthermic/Euglycemic  DVT PPx: Enoxaparin (Lovenox), SCDs  Per OP ophthalmology for ocular hypertension start eye drops Cosopt BID, Brimonidine BID and latanoprost qhs  PT/OT/ST/PMR evaluations when able  Stroke education and counseling  Rest of other care per primary team appreciated   Will need rheumatology follow-up  Disposition: PT Recommendations -

## 2025-03-19 NOTE — PROGRESS NOTES
Progress Note - Neurology   Name: Sabina Trotter 79 y.o. female I MRN: [id number]  Unit/Bed#: ED 02 I Date of Admission: 3/18/2025   Date of Service: 3/19/2025 I Hospital Day: 1    Assessment & Plan  Central retinal vein occlusion of right eye  79 y.o. female with pertinent PMH of hypertension, hyperlipidemia, RA, hypothyroidism.  She states that she noted a decrease in her visual acuity over the past week and a half.  Was evaluated her ophthalmologist today who noted that she has central retinal vein occlusion with possible central retinal artery occlusion.  Her ophthalmologist also noted macular edema of the right eye.  She recommended evaluation at the ED for stroke rule out.  Initial BP Blood Pressure: 161/90, FSBG POC Glucose: 95. CTA H/N no acute intracranial abnormalities, no LVO or high-grade stenosis.   - Home Antiplatelet /Anticoagulants PTA: No antiplatelet or anticoagulants  - Stroke risk factors: HTN and HLD  - Prior Stroke Hx: none  - Past Neurological Hx: no neurological problems    Workup:  - CTA: no acute intracranial abnormalities, no LVO or high-grade stenosis.    - MRI: pending  - TTE: pend  - Labs: Hemoglobin A1c 6.0 (3/18/2025), LDL 87 (3/18/2025), ESR 47, CRP 15    Impression: 79 y.o. female with history of hypertension, hyperlipidemia, RA, hypothyroidism who presented with decreased acuity of vision of the right eye and diagnosed with central retinal vein occlusion by her ophthalmologist who performed anterior chamber paracentesis, R eye IOP 39.    Plan: Discussed plan with neurology attending, Dr. Tillman  Admit along the stroke pathway with telemetry monitoring  Strict NPO prior to dysphagia screen   Frequent neuro checks per protocol. If there is any acute changes in exam, please obtain stat CT head and notify neurology team  BP Goals: Permissive Hypertension - SBP < 220/120 for first 24 hrs, followed by gradual reduction if neurologically stable  and Normotension of <130/80  Antiplatelet  agents: Aspirin 81 mg daily.  Anticoagulation: None  Statin: Change home statin therapy from lovastatin 20 mg daily to Atorvastatin 40 mg QHS  Brain Imaging: Obtain MRI brain without contrast  Temporal artery duplex  TTE, pending  Euthermic/Euglycemic  DVT PPx: Enoxaparin (Lovenox), SCDs  Per OP ophthalmology for ocular hypertension start eye drops Cosopt BID, Brimonidine BID and latanoprost qhs  PT/OT/ST/PMR evaluations when able  Stroke education and counseling  Rest of other care per primary team appreciated   Will need rheumatology follow-up  Disposition: PT Recommendations -      Hypothyroidism due to acquired atrophy of thyroid  Continue PTA levothyroxine  Mixed hyperlipidemia  Switched PTA lovastatin to Lipitor 40 mg per formulary  Essential hypertension  Holding PTA amlodipine, lisinopril  Rheumatoid arthritis 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  I have discussed with Dr. Tillman the above plan to treat pt. He agrees with the plan.    Subjective   Patient was seen resting in bed.  She has no vision changes since her presentation.  Denies any new neurologic sx at this time.      Review of Systems  negative    Objective :  Temp:  [98 °F (36.7 °C)] 98 °F (36.7 °C)  HR:  [52-84] 77  BP: (106-169)/(58-92) 143/69  Resp:  [16-32] 18  SpO2:  [94 %-98 %] 96 %  O2 Device: None (Room air)    Physical Exam  Constitutional:       General: She is not in acute distress.     Appearance: Normal appearance. She is not ill-appearing.   HENT:      Head: Normocephalic.      Mouth/Throat:      Mouth: Mucous membranes are moist.   Eyes:      Extraocular Movements: Extraocular movements intact.      Conjunctiva/sclera: Conjunctivae normal.   Cardiovascular:      Rate and Rhythm: Normal rate and regular rhythm.      Pulses: Normal pulses.      Heart sounds: Normal heart sounds.    Pulmonary:      Effort: Pulmonary effort is normal. No respiratory distress.      Breath sounds: Normal breath sounds.   Abdominal:      General: Abdomen is flat.      Palpations: Abdomen is soft.      Tenderness: There is no abdominal tenderness.   Neurological:      Motor: Motor strength is normal.     Coordination: Coordination is intact.      Deep Tendon Reflexes: Reflexes are normal and symmetric.   Psychiatric:         Speech: Speech normal.     Neurological Exam  Mental Status  Awake, alert and oriented to person, place and time. Oriented to person, place, time and situation. Speech is normal. Language is fluent with no aphasia.    Cranial Nerves  CN II: Right visual acuity: Counts fingers. Visual fields full to confrontation.  CN III, IV, VI: Extraocular movements intact bilaterally. R pupil dilated from eye exam earlier today, nonreactive.  CN V: Facial sensation is normal.  CN VII: Full and symmetric facial movement.  CN VIII:  Right: Hearing is decreased.  Left: Hearing is decreased.  CN IX, X: Palate elevates symmetrically  CN XI: Shoulder shrug strength is normal.  CN XII: Tongue midline without atrophy or fasciculations.    Motor   Strength is 5/5 throughout all four extremities.    Sensory  Light touch is normal in upper and lower extremities.     Reflexes  Deep tendon reflexes are 2+ and symmetric in all four extremities.    Coordination    Finger-to-nose, rapid alternating movements and heel-to-shin normal bilaterally without dysmetria.        Lab Results: I have reviewed the following results:  Imaging Results Review: No pertinent imaging studies reviewed.  Other Study Results Review: No additional pertinent studies reviewed.    VTE Pharmacologic Prophylaxis: VTE covered by:  enoxaparin, Subcutaneous, 40 mg at 03/19/25 0847

## 2025-03-20 ENCOUNTER — APPOINTMENT (INPATIENT)
Dept: RADIOLOGY | Facility: HOSPITAL | Age: 80
DRG: 123 | End: 2025-03-20
Payer: MEDICARE

## 2025-03-20 ENCOUNTER — APPOINTMENT (INPATIENT)
Dept: NON INVASIVE DIAGNOSTICS | Facility: HOSPITAL | Age: 80
DRG: 123 | End: 2025-03-20
Payer: MEDICARE

## 2025-03-20 PROCEDURE — 93882 EXTRACRANIAL UNI/LTD STUDY: CPT | Performed by: SURGERY

## 2025-03-20 PROCEDURE — 97166 OT EVAL MOD COMPLEX 45 MIN: CPT

## 2025-03-20 PROCEDURE — 70551 MRI BRAIN STEM W/O DYE: CPT

## 2025-03-20 PROCEDURE — 97162 PT EVAL MOD COMPLEX 30 MIN: CPT

## 2025-03-20 PROCEDURE — 93882 EXTRACRANIAL UNI/LTD STUDY: CPT

## 2025-03-20 RX ORDER — AMLODIPINE BESYLATE 5 MG/1
5 TABLET ORAL DAILY
Status: DISCONTINUED | OUTPATIENT
Start: 2025-03-21 | End: 2025-03-21 | Stop reason: HOSPADM

## 2025-03-20 RX ORDER — LISINOPRIL 20 MG/1
20 TABLET ORAL DAILY
Status: DISCONTINUED | OUTPATIENT
Start: 2025-03-21 | End: 2025-03-21 | Stop reason: HOSPADM

## 2025-03-20 RX ORDER — LABETALOL HYDROCHLORIDE 5 MG/ML
10 INJECTION, SOLUTION INTRAVENOUS EVERY 6 HOURS PRN
Status: DISCONTINUED | OUTPATIENT
Start: 2025-03-20 | End: 2025-03-21 | Stop reason: HOSPADM

## 2025-03-20 RX ADMIN — DORZOLAMIDE HYDROCHLORIDE AND TIMOLOL MALEATE 1 DROP: 20; 5 SOLUTION OPHTHALMIC at 20:01

## 2025-03-20 RX ADMIN — LATANOPROST 1 DROP: 50 SOLUTION OPHTHALMIC at 20:02

## 2025-03-20 RX ADMIN — BRIMONIDINE TARTRATE 1 DROP: 2 SOLUTION/ DROPS OPHTHALMIC at 07:36

## 2025-03-20 RX ADMIN — ENOXAPARIN SODIUM 40 MG: 40 INJECTION SUBCUTANEOUS at 07:36

## 2025-03-20 RX ADMIN — LORAZEPAM 0.5 MG: 2 INJECTION INTRAMUSCULAR; INTRAVENOUS at 15:33

## 2025-03-20 RX ADMIN — ATORVASTATIN CALCIUM 40 MG: 40 TABLET, FILM COATED ORAL at 17:22

## 2025-03-20 RX ADMIN — BRIMONIDINE TARTRATE 1 DROP: 2 SOLUTION/ DROPS OPHTHALMIC at 20:01

## 2025-03-20 RX ADMIN — ASPIRIN 81 MG: 81 TABLET, CHEWABLE ORAL at 07:36

## 2025-03-20 RX ADMIN — LEVOTHYROXINE SODIUM 88 MCG: 88 TABLET ORAL at 05:17

## 2025-03-20 RX ADMIN — DORZOLAMIDE HYDROCHLORIDE AND TIMOLOL MALEATE 1 DROP: 20; 5 SOLUTION OPHTHALMIC at 07:36

## 2025-03-20 NOTE — OCCUPATIONAL THERAPY NOTE
Occupational Therapy Evaluation     Patient Name: Sabina Trotter  Today's Date: 3/20/2025  Problem List  Principal Problem:    Central retinal vein occlusion of right eye  Active Problems:    Hypothyroidism due to acquired atrophy of thyroid    Mixed hyperlipidemia    Rheumatoid arthritis of multiple sites with negative rheumatoid factor (HCC)    Essential hypertension    Past Medical History  Past Medical History:   Diagnosis Date    Allergic 1952    Arthritis 2007    Cataract     Disease of thyroid gland     hypothyrodism     HL (hearing loss)     Hypertension     Lumbago     Pure hypercholesterolemia     Rheumatoid arthritis (HCC)     Stenosis of rectum and anus      Past Surgical History  Past Surgical History:   Procedure Laterality Date    CATARACT EXTRACTION Bilateral     CERVIX SURGERY      HEMORROIDECTOMY      MAMMO (HISTORICAL)  2020    [facility]    MENISCECTOMY      PA COLONOSCOPY FLX DX W/COLLJ SPEC WHEN PFRMD N/A 11/17/2017    Procedure: COLONOSCOPY;  Surgeon: Neyda Palencia MD;  Location: [facility];  Service: Colorectal         03/20/25 0915   OT Last Visit   OT Visit Date 03/20/25   Note Type   Note type Evaluation   Pain Assessment   Pain Assessment Tool 0-10   Pain Score No Pain   Restrictions/Precautions   Weight Bearing Precautions Per Order No   Other Precautions   (no chair/bed alarm - pt has opted out per RN)   Home Living   Type of Home House   Home Layout Two level   Prior Function   Level of [place] Independent with ADLs;Independent with functional mobility;Independent with IADLS   Lives With Spouse   Receives Help From Family   IADLs Independent with driving;Independent with meal prep;Independent with medication management   Falls in the last 6 months 0   Vocational Retired   Lifestyle   Autonomy I adls and mobility w/o ad - denies falls - i iadls - shares homemaking with spouse   Reciprocal Relationships supportive family - reports spouse is able to assist PRN   Service to Others  "retired   Intrinsic Gratification active pta   Subjective   Subjective \"I feel a lot better\"   ADL   Eating Assistance 7  Independent   Grooming Assistance 5  Supervision/Setup   UB Bathing Assistance 5  Supervision/Setup   LB Bathing Assistance 5  Supervision/Setup   UB Dressing Assistance 5  Supervision/Setup   LB Dressing Assistance 5  Supervision/Setup   Toileting Assistance  5  Supervision/Setup   Bed Mobility   Supine to Sit 5  Supervision   Transfers   Sit to Stand 5  Supervision   Stand to Sit 5  Supervision   Functional Mobility   Functional Mobility 5  Supervision   Balance   Static Sitting Good   Dynamic Sitting Fair +   Static Standing Fair +   Dynamic Standing Fair   Ambulatory Fair   Activity Tolerance   Activity Tolerance Patient tolerated treatment well   Medical Staff Made Aware PT present for co-eval 2* medical complexity, comorbidities and limited overall tolerance to activities   RUE Assessment   RUE Assessment WFL   LUE Assessment   LUE Assessment WFL   Cognition   Overall Cognitive Status WFL   Assessment   Limitation Decreased endurance;Decreased self-care trans;Decreased high-level ADLs   Prognosis Good   Assessment Pt is a 79 y.o. female who was admitted to [facility] on 3/18/2025 with Central retinal vein occlusion of right eye . Patient  has a past medical history of Allergic, Arthritis, Cataract, Disease of thyroid gland, HL (hearing loss), Hypertension, Lumbago, Pure hypercholesterolemia, Rheumatoid arthritis (HCC), and Stenosis of rectum and anus.   At baseline pt was completing adls and mobility independently - I iadls - shares homemaking with spouse. Pt lives with spouse in ranch home with 3 LESLY. Currently pt requires sba for overall ADLS and sba for functional mobility/transfers. Pt currently presents with impairments in the following categories -steps to enter environment and difficulty performing IADLS  activity tolerance, endurance, and standing balance/tolerance. These " impairments, as well as pt's fatigue  limit pt's ability to safely engage in all baseline areas of occupation, includingfunctional mobility/transfers, community mobility, laundry , driving, house maintenance, meal prep, cleaning, social participation , and leisure activities however has supportive family who are able to provide assist PRN -  From OT standpoint, recommend [facility] resources upon D/C. No immediate acute OT needs indicated at this time- anticipate d/c home with family support when medically cleared- d/c from caseload   Goals   Patient Goals go home   Plan   OT Frequency Eval only   Discharge Recommendation   Rehab Resource Intensity Level, OT No post-acute rehabilitation needs   AM-PAC Daily Activity Inpatient   Lower Body Dressing 4   Bathing 4   Toileting 4   Upper Body Dressing 4   Grooming 4   Eating 4   Daily Activity Raw Score 24   Daily Activity Standardized Score (Calc for Raw Score >=11) 57.54   AM-PAC Applied Cognition Inpatient   Following a Speech/Presentation 4   Understanding Ordinary Conversation 4   Taking Medications 4   Remembering Where Things Are Placed or Put Away 4   Remembering List of 4-5 Errands 3   Taking Care of Complicated Tasks 3   Applied Cognition Raw Score 22   Applied Cognition Standardized Score 47.83   End of Consult   Education Provided Yes   Patient Position at End of Consult Bedside chair;All needs within reach   Nurse Communication Nurse aware of consult       The patient's raw score on the AM-PAC Daily Activity Inpatient Short Form is 24. A raw score of greater than or equal to 19 suggests the patient may benefit from discharge to home. Please refer to the recommendation of the Occupational Therapist for safe discharge planning.    Documentation Completed By:    KAYLI Mcneil/L  [employer] Certified - [id number]

## 2025-03-20 NOTE — PLAN OF CARE
Problem: PAIN - ADULT  Goal: Verbalizes/displays adequate comfort level or baseline comfort level  Description: Interventions:  - Encourage patient to monitor pain and request assistance  - Assess pain using appropriate pain scale  - Administer analgesics based on type and severity of pain and evaluate response  - Implement non-pharmacological measures as appropriate and evaluate response  - Consider cultural and social influences on pain and pain management  - Notify physician/advanced practitioner if interventions unsuccessful or patient reports new pain  Outcome: Progressing     Problem: INFECTION - ADULT  Goal: Absence or prevention of progression during hospitalization  Description: INTERVENTIONS:  - Assess and monitor for signs and symptoms of infection  - Monitor lab/diagnostic results  - Monitor all insertion sites, i.e. indwelling lines, tubes, and drains  - Monitor endotracheal if appropriate and nasal secretions for changes in amount and color  - [place] appropriate cooling/warming therapies per order  - Administer medications as ordered  - Instruct and encourage patient and family to use good hand hygiene technique  - Identify and instruct in appropriate isolation precautions for identified infection/condition  Outcome: Progressing  Goal: Absence of fever/infection during neutropenic period  Description: INTERVENTIONS:  - Monitor WBC    Outcome: Progressing     Problem: SAFETY ADULT  Goal: Patient will remain free of falls  Description: INTERVENTIONS:  - Educate patient/family on patient safety including physical limitations  - Instruct patient to call for assistance with activity   - Consult OT/PT to assist with strengthening/mobility   - Keep Call bell within reach  - Keep bed low and locked with side rails adjusted as appropriate  - Keep care items and personal belongings within reach  - Initiate and maintain comfort rounds  - Make Fall Risk Sign visible to staff  - Offer Toileting every  Hours,  in advance of need  - Initiate/Maintain alarm  - Obtain necessary fall risk management equipment:   - Apply yellow socks and bracelet for high fall risk patients  - Consider moving patient to room near nurses station  Outcome: Progressing  Goal: Maintain or return to baseline ADL function  Description: INTERVENTIONS:  -  Assess patient's ability to carry out ADLs; assess patient's baseline for ADL function and identify physical deficits which impact ability to perform ADLs (bathing, care of mouth/teeth, toileting, grooming, dressing, etc.)  - Assess/evaluate cause of self-care deficits   - Assess range of motion  - Assess patient's mobility; develop plan if impaired  - Assess patient's need for assistive devices and provide as appropriate  - Encourage maximum independence but intervene and supervise when necessary  - Involve family in performance of ADLs  - Assess for home care needs following discharge   - Consider OT consult to assist with ADL evaluation and planning for discharge  - Provide patient education as appropriate  Outcome: Progressing  Goal: Maintains/Returns to pre admission functional level  Description: INTERVENTIONS:  - Perform AM-PAC 6 Click Basic Mobility/ Daily Activity assessment daily.  - Set and communicate daily mobility goal to care team and patient/family/caregiver.   - Collaborate with rehabilitation services on mobility goals if consulted  - Perform Range of Motion  times a day.  - Reposition patient every  hours.  - Dangle patient  times a day  - Stand patient  times a day  - Ambulate patient  times a day  - Out of bed to chair  times a day   - Out of bed for meals  times a day  - Out of bed for toileting  - Record patient progress and toleration of activity level   Outcome: Progressing

## 2025-03-20 NOTE — DISCHARGE SUMMARY
Discharge Summary - Neurology   Name: Sabina Trotter 79 y.o. female I MRN: [id number]  [facility] I Date of Admission: 3/18/2025   Date of Service: 3/20/2025 I Hospital Day: 2    Admission Date: 3/18/2025 1134  Discharge Date: 03/20/25  Admitting Diagnosis: Blurred vision [H53.8]  Visual loss, right eye [H54.61]  Discharge Diagnosis: Central Retinal Vein Occlusion of Right Eye  Medical Problems       Resolved Problems  Date Reviewed: 5/22/2024   None       Sabina Trotter will need follow-up in in 6 weeks with neurovascular team for Other in 60 minute appointment. They will not require outpatient neurological testing.  Staff message sent      HPI: Sabina Trotter is a 79 y.o. female with pertinent PMH of hypertension, hyperlipidemia, RA, hypothyroidism. She presented to the ED on 03/18 for decreased visual acuity of the right eye for the past week and a half.  She states that she has had decreased visual acuity in the right eye for at least the past year however an acute worsening at that time.  She also has a history of macular edema of the right eye as well as a prior vitreous hemorrhage status post vitrectomy, posterior uveitis and ocular hypertension.  She was evaluated by her ophthalmologist who diagnosed her with central retinal vein occlusion with possible central retinal artery occlusion as well.  She was advised to go to the ED for neurology workup to rule out stroke.      ED course: initial BP Blood Pressure: 161/90, FSBG POC Glucose: 95. CTA H/N no acute intracranial abnormalities, no LVO or high-grade stenosis.   - Home Antiplatelet /Anticoagulants PTA: No antiplatelet or anticoagulants  - Stroke risk factors: HTN and HLD  - Prior Stroke Hx: none  - Past Neurological Hx: no neurological problems    Procedures Performed: No orders of the defined types were placed in this encounter.      Summary of Hospital Course:  During her hospital course, CTA H/H was performed on 03/18/25 which showed no acute  intracranial abnormalities, no LOV or high-grade stenosis. ECHO performed on 03/19/25 showed normal left ventricular cavity size, normal wall thickness, left ventricular ejection fraction of 60%, and normal systolic function, wall motion, and diastolic function.     She was admitted in the hospital under the stroke pathway with telemetry monitoring, with frequent neuro checks per protocol. She was started on antiplatelet agent aspirin 81 mg daily, her home statin therapy was changed from lovastatin 20 mg daily to atorvastatin 40 mg QHS. For DVT prophylaxis she was placed on Lovenox and SCDs. Ophthalmology was consulted and for her ocular hypertension, she was started on eye drops Cosopt BID, Brimonidine BID, and latanoprost QHS.  MRI of the brain was negative for acute ischemia.  MRI of the brain was negative for acute ischemia.  Temporal artery ultrasounds were also negative for GCA and aneurysm.    Detailed assessment and plan is below.   Assessment & Plan  Central retinal vein occlusion of right eye  79 y.o. female with pertinent PMH of hypertension, hyperlipidemia, RA, hypothyroidism.  She states that she noted a decrease in her visual acuity over the past week and a half.  Was evaluated her ophthalmologist on day of admission who noted that she has central retinal vein occlusion with possible central retinal artery occlusion.  Her ophthalmologist also noted macular edema of the right eye.  She recommended evaluation at the ED for stroke rule out.  Initial BP Blood Pressure: 161/90, FSBG POC Glucose: 95. CTA H/N no acute intracranial abnormalities, no LVO or high-grade stenosis.   - Home Antiplatelet /Anticoagulants PTA: No antiplatelet or anticoagulants  - Stroke risk factors: HTN and HLD  - Prior Stroke Hx: none  - Past Neurological Hx: no neurological problems    Workup:  - CTA: no acute intracranial abnormalities, no LVO or high-grade stenosis.    - MRI: Negative  - TTE: 60% EF, normal atria.  -Temporal  artery ultrasound negative for signs of GCA, aneurysm  - Labs: Hemoglobin A1c 6.0 (3/18/2025), LDL 87 (3/18/2025), ESR 47, CRP 15    Impression: 79 y.o. female with history of hypertension, hyperlipidemia, RA, hypothyroidism who presented with decreased acuity of vision of the right eye and diagnosed with central retinal vein occlusion by her ophthalmologist who performed anterior chamber paracentesis, R eye IOP 39.      Plan: Discussed plan with neurology attending, Dr. Tillman  Admit along the stroke pathway with telemetry monitoring  Strict NPO prior to dysphagia screen   Frequent neuro checks per protocol. If there is any acute changes in exam, please obtain stat CT head and notify neurology team  BP Goals: Permissive Hypertension - SBP < 220/120 for first 24 hrs, followed by gradual reduction if neurologically stable  and Normotension of <130/80  Antiplatelet agents: Aspirin 81 mg daily.  Anticoagulation: None  Statin: Change home statin therapy from lovastatin 20 mg daily to Atorvastatin 40 mg QHS  Temporal artery duplex  TTE, pending  Euthermic/Euglycemic  DVT PPx: Enoxaparin (Lovenox), SCDs  Per OP ophthalmology for ocular hypertension start eye drops Cosopt BID, Brimonidine BID and latanoprost qhs  PT/OT/ST/PMR evaluations when able  Stroke education and counseling  Rest of other care per primary team appreciated   Will need rheumatology follow-up  Disposition: PT Recommendations -      Hypothyroidism due to acquired atrophy of thyroid  Continue PTA levothyroxine  Mixed hyperlipidemia  Switched PTA lovastatin to Lipitor 40 mg per formulary  Essential hypertension  Holding PTA amlodipine, lisinopril  Rheumatoid arthritis of multiple sites with negative rheumatoid factor (HCC)  Continue methotrexate 2.5 mg weekly on Fridays     Significant Findings, Care, Treatment and Services Provided: as listed below.   CTA head and neck w wo contrast on 03/18/25 impression: CT Brain: No acute intracranial abnormality.  Mild chronic microangiopathic changes. CT Angiography: No large vessel occlusion in the head or neck. Few scattered opacities in the left upper lobe, correlate clinically for mild bronchopneumonia and recommend follow-up to resolution.  ECHO 03/19/25 impression: Left Ventricle: Left ventricular cavity size is normal. Wall thickness is normal. The left ventricular ejection fraction is 60%. Systolic function is normal. Wall motion is normal. Diastolic function is normal.   MRI brain wo contrast 03/19/25 impression: pending   VAS temporal artery duplex 03/20/25 impression: pending     Complications: None.      Condition at Discharge: good     Subjective: Patient remains the same, patient has no further complaints at this time.    Neurologic Exam     Mental Status   Oriented to person, place, and time.   Speech: speech is normal   Level of consciousness: alert    Cranial Nerves     CN II   Visual fields full to confrontation.   Visual acuity: decreased (R eye only able to count fingers)    CN III, IV, VI   Pupils are equal, round, and reactive to light.  Extraocular motions are normal.     CN V   Facial sensation intact.     CN VII   Facial expression full, symmetric.     CN VIII   CN VIII normal.     CN IX, X   CN IX normal.   CN X normal.     CN XI   CN XI normal.     CN XII   CN XII normal.     Motor Exam   Muscle bulk: normal  Overall muscle tone: normal    Strength   Strength 5/5 throughout.     Sensory Exam   Light touch normal.     Gait, Coordination, and Reflexes     Gait  Gait: normal    Coordination   Finger to nose coordination: normal  Heel to shin coordination: normal    Reflexes   Right brachioradialis: 2+  Left brachioradialis: 2+  Right biceps: 2+  Left biceps: 2+  Right triceps: 2+  Left triceps: 2+  Right patellar: 2+  Left patellar: 2+  Right achilles: 2+  Left achilles: 2+      Discharge instructions/Information to patient and family:   See After Visit Summary (AVS) for information provided to  patient and family.      Provisions for Follow-Up Care:  See after visit summary for information related to follow-up care and any pertinent home health orders.      PCP: Renetta Myrick MD    Disposition: Home    Planned Readmission: No     Discharge Medications:  See after visit summary for reconciled discharge medications provided to patient and family.      Discharge Statement:  I have spent a total time of 30 minutes in caring for this patient on the day of the visit/encounter. >30 minutes of time was spent on: Diagnostic results, Prognosis, Instructions for management, Impressions, and Documenting in the medical record.

## 2025-03-20 NOTE — PROGRESS NOTES
Progress Note - Neurology   Name: Sabina Trotter 79 y.o. female I MRN: [id number]  [facility] I Date of Admission: 3/18/2025   Date of Service: 3/20/2025 I Hospital Day: 2    Assessment & Plan  Central retinal vein occlusion of right eye  79 y.o. female with pertinent PMH of hypertension, hyperlipidemia, RA, hypothyroidism.  She states that she noted a decrease in her visual acuity over the past week and a half.  Was evaluated her ophthalmologist today who noted that she has central retinal vein occlusion with possible central retinal artery occlusion.  Her ophthalmologist also noted macular edema of the right eye.  She recommended evaluation at the ED for stroke rule out.  Initial BP Blood Pressure: 161/90, FSBG POC Glucose: 95. CTA H/N no acute intracranial abnormalities, no LVO or high-grade stenosis.   - Home Antiplatelet /Anticoagulants PTA: No antiplatelet or anticoagulants  - Stroke risk factors: HTN and HLD  - Prior Stroke Hx: none  - Past Neurological Hx: no neurological problems    Workup:  - CTA: no acute intracranial abnormalities, no LVO or high-grade stenosis.    - MRI: pending  - TTE: 60% EF, normal atria.  - Labs: Hemoglobin A1c 6.0 (3/18/2025), LDL 87 (3/18/2025), ESR 47, CRP 15  - Temporal artery duplex: No signs of GCA, aneurysm or significant stenosis bilaterally    Impression: 79 y.o. female with history of hypertension, hyperlipidemia, RA, hypothyroidism who presented with decreased acuity of vision of the right eye and diagnosed with central retinal vein occlusion by her ophthalmologist who performed anterior chamber paracentesis, R eye IOP 39.    Plan: Discussed plan with neurology attending, Dr. Tillman  Frequent neuro checks per protocol. If there is any acute changes in exam, please obtain stat CT head and notify neurology team  BP Goals: Permissive Hypertension - SBP < 220/120 for first 24 hrs, followed by gradual reduction if neurologically stable  and Normotension of  <130/80  Antiplatelet agents: Aspirin 81 mg daily.  Anticoagulation: None  Statin: Change home statin therapy from lovastatin 20 mg daily to Atorvastatin 40 mg QHS  Brain Imaging: Obtain MRI brain without contrast  Euthermic/Euglycemic  DVT PPx: Enoxaparin (Lovenox), SCDs  Per OP ophthalmology for ocular hypertension start eye drops Cosopt BID, Brimonidine BID and latanoprost qhs  Will need OP zio patch, if negative loop recorder  PT/OT/ST/PMR evaluations when able  Stroke education and counseling  Rest of other care per primary team appreciated   Will need rheumatology follow-up  Disposition: PT Recommendations -      Hypothyroidism due to acquired atrophy of thyroid  Continue PTA levothyroxine  Mixed hyperlipidemia  Switched PTA lovastatin to Lipitor 40 mg per formulary  Essential hypertension  Holding PTA amlodipine, lisinopril  Rheumatoid arthritis 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  I have discussed with Dr. Tillman the above plan to treat pt. He agrees with the plan.    Subjective   Pt was seen after getting her temporal artery duplex.  She denies any change in her vision or any other complaints at this time.      Review of Systems  negative    Objective :  Temp:  [97.3 °F (36.3 °C)-98.7 °F (37.1 °C)] 98 °F (36.7 °C)  HR:  [60-79] 74  BP: (112-160)/(65-88) 140/84  Resp:  [15-18] 17  SpO2:  [96 %-98 %] 98 %  O2 Device: None (Room air)    Physical Exam  Constitutional:       General: She is not in acute distress.     Appearance: Normal appearance. She is not ill-appearing.   HENT:      Head: Normocephalic.      Mouth/Throat:      Mouth: Mucous membranes are moist.   Eyes:      Extraocular Movements: Extraocular movements intact.      Conjunctiva/sclera: Conjunctivae normal.   Cardiovascular:      Rate and Rhythm: Normal rate and regular rhythm.      Pulses:  Normal pulses.      Heart sounds: Normal heart sounds.   Pulmonary:      Effort: Pulmonary effort is normal. No respiratory distress.      Breath sounds: Normal breath sounds.   Abdominal:      General: Abdomen is flat.      Palpations: Abdomen is soft.      Tenderness: There is no abdominal tenderness.   Neurological:      Motor: Motor strength is normal.     Coordination: Coordination is intact.      Deep Tendon Reflexes: Reflexes are normal and symmetric.   Psychiatric:         Speech: Speech normal.     Neurological Exam  Mental Status  Awake, alert and oriented to person, place and time. Oriented to person, place, time and situation. Speech is normal. Language is fluent with no aphasia.    Cranial Nerves  CN II: Right visual acuity: Counts fingers. Visual fields full to confrontation.  CN III, IV, VI: Extraocular movements intact bilaterally. R pupil dilated from eye exam earlier today, nonreactive.  CN V: Facial sensation is normal.  CN VII: Full and symmetric facial movement.  CN VIII:  Right: Hearing is decreased.  Left: Hearing is decreased.  CN IX, X: Palate elevates symmetrically  CN XI: Shoulder shrug strength is normal.  CN XII: Tongue midline without atrophy or fasciculations.    Motor   Strength is 5/5 throughout all four extremities.    Sensory  Light touch is normal in upper and lower extremities.     Reflexes  Deep tendon reflexes are 2+ and symmetric in all four extremities.    Coordination    Finger-to-nose, rapid alternating movements and heel-to-shin normal bilaterally without dysmetria.        Lab Results: I have reviewed the following results:  Imaging Results Review: No pertinent imaging studies reviewed.  Other Study Results Review: No additional pertinent studies reviewed.    VTE Pharmacologic Prophylaxis: VTE covered by:  enoxaparin, Subcutaneous, 40 mg at 03/20/25 0977

## 2025-03-20 NOTE — PHYSICAL THERAPY NOTE
PHYSICAL THERAPY EVALUATION          Patient Name: Sabina Trotter  Today's Date: 3/20/2025       03/20/25 0916   Note Type   Note type Evaluation   Pain Assessment   Pain Assessment Tool 0-10   Pain Score No Pain   Restrictions/Precautions   Weight Bearing Precautions Per Order No   Other Precautions Impulsive  (No chair/bed alarm due to pt opting out per nurse.)   Home Living   Type of Home House   Home Layout Two level  (3 LESLY)   Home Equipment Other (Comment)  (None per pt)   Additional Comments Pt reports living with spouse, was not assisting pt PTA.   Prior Function   Level of [place] Independent with functional mobility   Lives With Spouse   Receives Help From Family   Falls in the last 6 months 0   Comments Pt declines assistive device usage PTA. Pt does not own any DME.   General   Family/Caregiver Present No   Cognition   Overall Cognitive Status WFL   Arousal/Participation Alert   Orientation Level Oriented X4   RLE Assessment   RLE Assessment WFL   Strength RLE   RLE Overall Strength 4/5  (Assessed functionally.)   LLE Assessment   LLE Assessment WFL   Strength LLE   LLE Overall Strength 4/5  (Assessed functionally.)   Bed Mobility   Supine to Sit 5  Supervision   Sit to Supine Unable to assess   Additional Comments Pt left in bedside chair at the end of the session.   Transfers   Sit to Stand 5  Supervision   Additional items Verbal cues;Armrests   Stand to Sit 5  Supervision   Additional items Verbal cues;Armrests   Ambulation/Elevation   Gait pattern Inconsistent cici;Decreased heel strike;Decreased foot clearance;WNL   Gait Assistance 5  Supervision   Assistive Device None   Distance [id number]   Stair Management Assistance 5  Supervision   Additional items Verbal cues   Stair Management Technique Reciprocal;Two rails   Number of Stairs 3   Balance   Static Sitting Good   Dynamic Sitting Fair +   Static Standing  Fair +   Dynamic Standing Fair   Ambulatory Fair   Activity Tolerance   Activity Tolerance Patient tolerated treatment well   Medical Staff Made Aware Guillaume OT and Tej PT were present for co-evaluation due to the pt's current medical presentation.   Nurse Made Aware Pt is appropriate to be seen and mobilize per nsg.   Assessment   Prognosis Excellent   Problem List Decreased safety awareness   Assessment Pt is a 79 y.o. female seen for PT evaluation on 3/20/2025 after being [facility] to [facility]18[facility] for decreased visual acuity of R eye with diagnosis of central retinal vein occlusion by ophthalmologist, workup for stroke. Pt's current/active diagnoses include hypothyroidism due to acquired atrophy of thyroid, mixed hyperlipidemia, essential hypertension, rheumatoid arthritis of multiple sites with negative rheumatoid factor. Pt has a past medical history of Allergic, Arthritis, Cataract, Disease of thyroid gland, HL (hearing loss), Hypertension, Lumbago, Pure hypercholesterolemia, Rheumatoid arthritis (HCC), and Stenosis of rectum and anus. PT evaluation was ordered to assess the pt's functional mobility and discharge recommendations. Prior to admission, pt was independent with functional mobility without the use of an assistive device. Pt lives with spouse in a ranch style home with 3 LESLY. Pt is of moderate complexity due to ongoing medical management, continuous pulse oximetry monitoring, impaired judgement/safety awareness. At evaluation, pt performed bed mobility with supervision. Pt performed sit to stand/stand to sit transfers with supervision. Pt ambulated with supervision for [id number] without the use of an assistive device. Pt negotiated 3 steps with bilateral handrails and reciprocal pattern with supervision.  Pt presents with the following PT impairments decreased mobility, impaired judgement/safety awareness, gait deviations. The pt was left sitting out of bed in bedside chair with  all needs within reach. Based on the pt's functional performance during the PT evaluation, the discharge recommendation is no post-acute rehabilitation needs. The pt does not require further skilled IP PT services based on their performance during the PT evaluation, d/c IP PT.   Goals   Patient Goals To go home.   Plan   PT Frequency Other (Comment)  (d/c IP PT)   Discharge Recommendation   Rehab Resource Intensity Level, PT No post-acute rehabilitation needs   AM-PAC Basic Mobility Inpatient   Turning in Flat Bed Without Bedrails 4   Lying on Back to Sitting on Edge of Flat Bed Without Bedrails 4   Moving Bed to Chair 4   Standing Up From Chair Using Arms 4   Walk in Room 4   Climb 3-5 Stairs With Railing 4   Basic Mobility Inpatient Raw Score 24   Basic Mobility Standardized Score 57.68   [facility] Highest Level Of Mobility   -HLM Goal 8: Walk 250 feet or more   -HLM Achieved 8: Walk 250 feet ot more   Modified [place] Scale   Modified Stephan Scale 1   End of Consult   Patient Position at End of Consult Bedside chair;All needs within reach     Marley Medina, SPT

## 2025-03-20 NOTE — PLAN OF CARE
Problem: INFECTION - ADULT  Goal: Absence or prevention of progression during hospitalization  Description: INTERVENTIONS:  - Assess and monitor for signs and symptoms of infection  - Monitor lab/diagnostic results  - Monitor all insertion sites, i.e. indwelling lines, tubes, and drains  - Monitor endotracheal if appropriate and nasal secretions for changes in amount and color  - [place] appropriate cooling/warming therapies per order  - Administer medications as ordered  - Instruct and encourage patient and family to use good hand hygiene technique  - Identify and instruct in appropriate isolation precautions for identified infection/condition  Outcome: Progressing     Problem: PAIN - ADULT  Goal: Verbalizes/displays adequate comfort level or baseline comfort level  Description: Interventions:  - Encourage patient to monitor pain and request assistance  - Assess pain using appropriate pain scale  - Administer analgesics based on type and severity of pain and evaluate response  - Implement non-pharmacological measures as appropriate and evaluate response  - Consider cultural and social influences on pain and pain management  - Notify physician/advanced practitioner if interventions unsuccessful or patient reports new pain  Outcome: Progressing     Problem: SAFETY ADULT  Goal: Maintain or return to baseline ADL function  Description: INTERVENTIONS:  -  Assess patient's ability to carry out ADLs; assess patient's baseline for ADL function and identify physical deficits which impact ability to perform ADLs (bathing, care of mouth/teeth, toileting, grooming, dressing, etc.)  - Assess/evaluate cause of self-care deficits   - Assess range of motion  - Assess patient's mobility; develop plan if impaired  - Assess patient's need for assistive devices and provide as appropriate  - Encourage maximum independence but intervene and supervise when necessary  - Involve family in performance of ADLs  - Assess for home care  needs following discharge   - Consider OT consult to assist with ADL evaluation and planning for discharge  - Provide patient education as appropriate  Outcome: Progressing     Problem: Knowledge Deficit  Goal: Patient/family/caregiver demonstrates understanding of disease process, treatment plan, medications, and discharge instructions  Description: Complete learning assessment and assess knowledge base.  Interventions:  - Provide teaching at level of understanding  - Provide teaching via preferred learning methods  Outcome: Progressing

## 2025-03-20 NOTE — PROGRESS NOTES
Patient is a low fall risk ,scoring a 21. Patient is refusing bed/chair alarm. Neurology is aware and since patient is a  low fall risk ,its okay to leave the alarms off.

## 2025-03-20 NOTE — HOSPITAL COURSE
HPI: Sabina Trotter is a 79 y.o. female with pertinent PMH of hypertension, hyperlipidemia, RA, hypothyroidism. She presented to the ED on 03/18 for decreased visual acuity of the right eye for the past week and a half.  She states that she has had decreased visual acuity in the right eye for at least the past year however an acute worsening at that time.  She also has a history of macular edema of the right eye as well as a prior vitreous hemorrhage status post vitrectomy, posterior uveitis and ocular hypertension.  She was evaluated by her ophthalmologist who diagnosed her with central retinal vein occlusion with possible central retinal artery occlusion as well.  She was advised to go to the ED for neurology workup to rule out stroke.       ED course: initial BP Blood Pressure: 161/90, FSBG POC Glucose: 95. CTA H/N no acute intracranial abnormalities, no LVO or high-grade stenosis.   - Home Antiplatelet /Anticoagulants PTA: No antiplatelet or anticoagulants  - Stroke risk factors: HTN and HLD  - Prior Stroke Hx: none  - Past Neurological Hx: no neurological problems     Procedures Performed: No orders of the defined types were placed in this encounter.        Summary of Hospital Course:  During her hospital course, CTA H/H was performed on 03/18/25 which showed no acute intracranial abnormalities, no LOV or high-grade stenosis. ECHO performed on 03/19/25 showed normal left ventricular cavity size, normal wall thickness, left ventricular ejection fraction of 60%, and normal systolic function, wall motion, and diastolic function.      She was admitted in the hospital under the stroke pathway with telemetry monitoring, with frequent neuro checks per protocol. She was started on antiplatelet agent aspirin 81 mg daily, her home statin therapy was changed from lovastatin 20 mg daily to atorvastatin 40 mg QHS. For DVT prophylaxis she was placed on Lovenox and SCDs. Ophthalmology was consulted and for her ocular  hypertension, she was started on eye drops Cosopt BID, Brimonidine BID, and latanoprost QHS.      Detailed course is in the assessment and plan.    Significant Findings, Care, Treatment and Services Provided: as listed below.   CTA head and neck w wo contrast on 03/18/25 impression: CT Brain: No acute intracranial abnormality. Mild chronic microangiopathic changes. CT Angiography: No large vessel occlusion in the head or neck. Few scattered opacities in the left upper lobe, correlate clinically for mild bronchopneumonia and recommend follow-up to resolution.  ECHO 03/19/25 impression: Left Ventricle: Left ventricular cavity size is normal. Wall thickness is normal. The left ventricular ejection fraction is 60%. Systolic function is normal. Wall motion is normal. Diastolic function is normal.   MRI brain wo contrast 03/19/25 impression: pending   VAS temporal artery duplex 03/20/25 impression: pending

## 2025-03-20 NOTE — ASSESSMENT & PLAN NOTE
79 y.o. female with pertinent PMH of hypertension, hyperlipidemia, RA, hypothyroidism.  She states that she noted a decrease in her visual acuity over the past week and a half.  Was evaluated her ophthalmologist today who noted that she has central retinal vein occlusion with possible central retinal artery occlusion.  Her ophthalmologist also noted macular edema of the right eye.  She recommended evaluation at the ED for stroke rule out.  Initial BP Blood Pressure: 161/90, FSBG POC Glucose: 95. CTA H/N no acute intracranial abnormalities, no LVO or high-grade stenosis.   - Home Antiplatelet /Anticoagulants PTA: No antiplatelet or anticoagulants  - Stroke risk factors: HTN and HLD  - Prior Stroke Hx: none  - Past Neurological Hx: no neurological problems    Workup:  - CTA: no acute intracranial abnormalities, no LVO or high-grade stenosis.    - MRI: pending  - TTE: 60% EF, normal atria.  - Labs: Hemoglobin A1c 6.0 (3/18/2025), LDL 87 (3/18/2025), ESR 47, CRP 15  - Temporal artery duplex: No signs of GCA, aneurysm or significant stenosis bilaterally    Impression: 79 y.o. female with history of hypertension, hyperlipidemia, RA, hypothyroidism who presented with decreased acuity of vision of the right eye and diagnosed with central retinal vein occlusion by her ophthalmologist who performed anterior chamber paracentesis, R eye IOP 39.    Plan: Discussed plan with neurology attending, Dr. Tillman  Frequent neuro checks per protocol. If there is any acute changes in exam, please obtain stat CT head and notify neurology team  BP Goals: Permissive Hypertension - SBP < 220/120 for first 24 hrs, followed by gradual reduction if neurologically stable  and Normotension of <130/80  Antiplatelet agents: Aspirin 81 mg daily.  Anticoagulation: None  Statin: Change home statin therapy from lovastatin 20 mg daily to Atorvastatin 40 mg QHS  Brain Imaging: Obtain MRI brain without contrast  Euthermic/Euglycemic  DVT PPx: Enoxaparin  (Lovenox), SCDs  Per OP ophthalmology for ocular hypertension start eye drops Cosopt BID, Brimonidine BID and latanoprost qhs  Will need OP zio patch, if negative loop recorder  PT/OT/ST/PMR evaluations when able  Stroke education and counseling  Rest of other care per primary team appreciated   Will need rheumatology follow-up  Disposition: PT Recommendations -

## 2025-03-20 NOTE — PLAN OF CARE
Problem: PAIN - ADULT  Goal: Verbalizes/displays adequate comfort level or baseline comfort level  Description: Interventions:  - Encourage patient to monitor pain and request assistance  - Assess pain using appropriate pain scale  - Administer analgesics based on type and severity of pain and evaluate response  - Implement non-pharmacological measures as appropriate and evaluate response  - Consider cultural and social influences on pain and pain management  - Notify physician/advanced practitioner if interventions unsuccessful or patient reports new pain  Outcome: Progressing     Problem: INFECTION - ADULT  Goal: Absence or prevention of progression during hospitalization  Description: INTERVENTIONS:  - Assess and monitor for signs and symptoms of infection  - Monitor lab/diagnostic results  - Monitor all insertion sites, i.e. indwelling lines, tubes, and drains  - Monitor endotracheal if appropriate and nasal secretions for changes in amount and color  - [place] appropriate cooling/warming therapies per order  - Administer medications as ordered  - Instruct and encourage patient and family to use good hand hygiene technique  - Identify and instruct in appropriate isolation precautions for identified infection/condition  Outcome: Progressing  Goal: Absence of fever/infection during neutropenic period  Description: INTERVENTIONS:  - Monitor WBC    Outcome: Progressing     Problem: SAFETY ADULT  Goal: Patient will remain free of falls  Description: INTERVENTIONS:  - Educate patient/family on patient safety including physical limitations  - Instruct patient to call for assistance with activity   - Consult OT/PT to assist with strengthening/mobility   - Keep Call bell within reach  - Keep bed low and locked with side rails adjusted as appropriate  - Keep care items and personal belongings within reach  - Initiate and maintain comfort rounds  - Make Fall Risk Sign visible to staff  - Offer Toileting every  Hours,  in advance of need  - Initiate/Maintain alarm  - Obtain necessary fall risk management equipment:   - Apply yellow socks and bracelet for high fall risk patients  - Consider moving patient to room near nurses station  Outcome: Progressing  Goal: Maintain or return to baseline ADL function  Description: INTERVENTIONS:  -  Assess patient's ability to carry out ADLs; assess patient's baseline for ADL function and identify physical deficits which impact ability to perform ADLs (bathing, care of mouth/teeth, toileting, grooming, dressing, etc.)  - Assess/evaluate cause of self-care deficits   - Assess range of motion  - Assess patient's mobility; develop plan if impaired  - Assess patient's need for assistive devices and provide as appropriate  - Encourage maximum independence but intervene and supervise when necessary  - Involve family in performance of ADLs  - Assess for home care needs following discharge   - Consider OT consult to assist with ADL evaluation and planning for discharge  - Provide patient education as appropriate  Outcome: Progressing  Goal: Maintains/Returns to pre admission functional level  Description: INTERVENTIONS:  - Perform AM-PAC 6 Click Basic Mobility/ Daily Activity assessment daily.  - Set and communicate daily mobility goal to care team and patient/family/caregiver.   - Collaborate with rehabilitation services on mobility goals if consulted  - Perform Range of Motion  times a day.  - Reposition patient every  hours.  - Dangle patient  times a day  - Stand patient  times a day  - Ambulate patient  times a day  - Out of bed to chair  times a day   - Out of bed for meals  times a day  - Out of bed for toileting  - Record patient progress and toleration of activity level   Outcome: Progressing     Problem: DISCHARGE PLANNING  Goal: Discharge to home or other facility with appropriate resources  Description: INTERVENTIONS:  - Identify barriers to discharge w/patient and caregiver  - Arrange for  needed discharge resources and transportation as appropriate  - Identify discharge learning needs (meds, wound care, etc.)  - Arrange for interpretive services to assist at discharge as needed  - Refer to Case Management Department for coordinating discharge planning if the patient needs post-hospital services based on physician/advanced practitioner order or complex needs related to functional status, cognitive ability, or social support system  Outcome: Progressing     Problem: Knowledge Deficit  Goal: Patient/family/caregiver demonstrates understanding of disease process, treatment plan, medications, and discharge instructions  Description: Complete learning assessment and assess knowledge base.  Interventions:  - Provide teaching at level of understanding  - Provide teaching via preferred learning methods  Outcome: Progressing

## 2025-03-20 NOTE — ASSESSMENT & PLAN NOTE
79 y.o. female with pertinent PMH of hypertension, hyperlipidemia, RA, hypothyroidism.  She states that she noted a decrease in her visual acuity over the past week and a half.  Was evaluated her ophthalmologist on day of admission who noted that she has central retinal vein occlusion with possible central retinal artery occlusion.  Her ophthalmologist also noted macular edema of the right eye.  She recommended evaluation at the ED for stroke rule out.  Initial BP Blood Pressure: 161/90, FSBG POC Glucose: 95. CTA H/N no acute intracranial abnormalities, no LVO or high-grade stenosis.   - Home Antiplatelet /Anticoagulants PTA: No antiplatelet or anticoagulants  - Stroke risk factors: HTN and HLD  - Prior Stroke Hx: none  - Past Neurological Hx: no neurological problems    Workup:  - CTA: no acute intracranial abnormalities, no LVO or high-grade stenosis.    - MRI: Negative  - TTE: 60% EF, normal atria.  -Temporal artery ultrasound negative for signs of GCA, aneurysm  - Labs: Hemoglobin A1c 6.0 (3/18/2025), LDL 87 (3/18/2025), ESR 47, CRP 15    Impression: 79 y.o. female with history of hypertension, hyperlipidemia, RA, hypothyroidism who presented with decreased acuity of vision of the right eye and diagnosed with central retinal vein occlusion by her ophthalmologist who performed anterior chamber paracentesis, R eye IOP 39.      Plan: Discussed plan with neurology attending, Dr. Tillman  Admit along the stroke pathway with telemetry monitoring  Strict NPO prior to dysphagia screen   Frequent neuro checks per protocol. If there is any acute changes in exam, please obtain stat CT head and notify neurology team  BP Goals: Permissive Hypertension - SBP < 220/120 for first 24 hrs, followed by gradual reduction if neurologically stable  and Normotension of <130/80  Antiplatelet agents: Aspirin 81 mg daily.  Anticoagulation: None  Statin: Change home statin therapy from lovastatin 20 mg daily to Atorvastatin 40 mg  QHS  Temporal artery duplex  TTE, pending  Euthermic/Euglycemic  DVT PPx: Enoxaparin (Lovenox), SCDs  Per OP ophthalmology for ocular hypertension start eye drops Cosopt BID, Brimonidine BID and latanoprost qhs  PT/OT/ST/PMR evaluations when able  Stroke education and counseling  Rest of other care per primary team appreciated   Will need rheumatology follow-up  Disposition: PT Recommendations -

## 2025-03-21 ENCOUNTER — TELEPHONE (OUTPATIENT)
Age: 80
End: 2025-03-21

## 2025-03-21 VITALS
TEMPERATURE: 97.6 F | OXYGEN SATURATION: 97 % | WEIGHT: 128 LBS | HEIGHT: 60 IN | RESPIRATION RATE: 17 BRPM | BODY MASS INDEX: 25.13 KG/M2 | HEART RATE: 82 BPM | SYSTOLIC BLOOD PRESSURE: 132 MMHG | DIASTOLIC BLOOD PRESSURE: 86 MMHG

## 2025-03-21 DIAGNOSIS — I10 ESSENTIAL HYPERTENSION: ICD-10-CM

## 2025-03-21 RX ORDER — LATANOPROST 50 UG/ML
1 SOLUTION/ DROPS OPHTHALMIC
Qty: 2.5 ML | Refills: 0 | Status: SHIPPED | OUTPATIENT
Start: 2025-03-21

## 2025-03-21 RX ORDER — ATORVASTATIN CALCIUM 40 MG/1
40 TABLET, FILM COATED ORAL EVERY EVENING
Qty: 30 TABLET | Refills: 0 | Status: SHIPPED | OUTPATIENT
Start: 2025-03-21 | End: 2025-04-04 | Stop reason: SDUPTHER

## 2025-03-21 RX ORDER — ASPIRIN 81 MG/1
81 TABLET, CHEWABLE ORAL DAILY
Qty: 30 TABLET | Refills: 0 | Status: SHIPPED | OUTPATIENT
Start: 2025-03-22 | End: 2025-04-04 | Stop reason: SDUPTHER

## 2025-03-21 RX ORDER — DORZOLAMIDE HYDROCHLORIDE AND TIMOLOL MALEATE 20; 5 MG/ML; MG/ML
1 SOLUTION/ DROPS OPHTHALMIC EVERY 12 HOURS SCHEDULED
Qty: 10 ML | Refills: 0 | Status: SHIPPED | OUTPATIENT
Start: 2025-03-21

## 2025-03-21 RX ORDER — LISINOPRIL 20 MG/1
20 TABLET ORAL DAILY
Qty: 90 TABLET | Refills: 1 | Status: SHIPPED | OUTPATIENT
Start: 2025-03-21

## 2025-03-21 RX ORDER — BRIMONIDINE TARTRATE 2 MG/ML
1 SOLUTION/ DROPS OPHTHALMIC EVERY 12 HOURS SCHEDULED
Qty: 5 ML | Refills: 0 | Status: SHIPPED | OUTPATIENT
Start: 2025-03-21

## 2025-03-21 RX ADMIN — LEVOTHYROXINE SODIUM 88 MCG: 88 TABLET ORAL at 04:42

## 2025-03-21 RX ADMIN — LISINOPRIL 20 MG: 20 TABLET ORAL at 08:42

## 2025-03-21 RX ADMIN — AMLODIPINE BESYLATE 5 MG: 5 TABLET ORAL at 08:42

## 2025-03-21 RX ADMIN — METHOTREXATE 15 MG: 2.5 TABLET ORAL at 12:53

## 2025-03-21 RX ADMIN — ASPIRIN 81 MG: 81 TABLET, CHEWABLE ORAL at 08:42

## 2025-03-21 RX ADMIN — BRIMONIDINE TARTRATE 1 DROP: 2 SOLUTION/ DROPS OPHTHALMIC at 08:42

## 2025-03-21 RX ADMIN — DORZOLAMIDE HYDROCHLORIDE AND TIMOLOL MALEATE 1 DROP: 20; 5 SOLUTION OPHTHALMIC at 08:42

## 2025-03-21 RX ADMIN — ENOXAPARIN SODIUM 40 MG: 40 INJECTION SUBCUTANEOUS at 08:42

## 2025-03-21 NOTE — DISCHARGE INSTR - AVS FIRST PAGE
Please take aspirin 81 mg daily, and take atorvastatin 40 mg daily until you can follow-up with your ophthalmologist.  Please discontinue taking lovastatin if they are able to determine that you also have a central retinal artery occlusion please continue to take these medications daily as well as obtaining heart monitoring.  If they are unsure please continue to take these medications until you follow-up with your outpatient neurologist.      Please take your eyedrops as prescribed by your ophthalmologist, Cosopt 1 drop twice daily, Brimonidine 1 drop twice daily and latanoprost 1 drop nightly, for right eye only.  Please discontinue your timolol.

## 2025-03-21 NOTE — TELEPHONE ENCOUNTER
STILL ADMITTED:3/18/2025 - present (3 days)  [facility]        1ST ATTEMPT,     VIA [email or web address]     Thank you,     Kassandra GOULD/ SAMUEL OTERO/ Central retinal vein occlusion of right eye     DC-     ----- Message from Marv Kearns DO sent at 3/21/2025  1:35 PM EDT -----  Regarding: HFPEREZ Trotter will need follow-up in in 6 weeks with neurovascular team for Other= ATTENDING  in 60 minute appointment. They will not require outpatient neurological testing.

## 2025-03-28 NOTE — TELEPHONE ENCOUNTER
DC- HOME- 3/21/2025    Called patient and spoke with her regarding HFU. Patient declined to schedule. I did advise her that she can schedule an HFU up to 1 yr from her d/c date, anything after that would be a new patient appt. She verbalized understanding.    Not scheduling HFU at this time, pt will be seeing PCP first and call us back if needed.       Thank you,     Kassandra

## 2025-04-04 ENCOUNTER — OFFICE VISIT (OUTPATIENT)
Age: 80
End: 2025-04-04
Payer: MEDICARE

## 2025-04-04 VITALS
TEMPERATURE: 98.2 F | BODY MASS INDEX: 24.54 KG/M2 | SYSTOLIC BLOOD PRESSURE: 122 MMHG | OXYGEN SATURATION: 98 % | HEART RATE: 64 BPM | HEIGHT: 60 IN | WEIGHT: 125 LBS | DIASTOLIC BLOOD PRESSURE: 76 MMHG

## 2025-04-04 DIAGNOSIS — R93.89 ABNORMAL CT SCAN: ICD-10-CM

## 2025-04-04 DIAGNOSIS — M06.09 RHEUMATOID ARTHRITIS OF MULTIPLE SITES WITH NEGATIVE RHEUMATOID FACTOR (HCC): ICD-10-CM

## 2025-04-04 DIAGNOSIS — E03.4 HYPOTHYROIDISM DUE TO ACQUIRED ATROPHY OF THYROID: ICD-10-CM

## 2025-04-04 DIAGNOSIS — E78.2 MIXED HYPERLIPIDEMIA: ICD-10-CM

## 2025-04-04 DIAGNOSIS — H34.8112 CENTRAL RETINAL VEIN OCCLUSION OF RIGHT EYE, UNSPECIFIED COMPLICATION STATUS: ICD-10-CM

## 2025-04-04 DIAGNOSIS — I10 ESSENTIAL HYPERTENSION: ICD-10-CM

## 2025-04-04 DIAGNOSIS — Z76.89 ENCOUNTER FOR SUPPORT AND COORDINATION OF TRANSITION OF CARE: Primary | ICD-10-CM

## 2025-04-04 DIAGNOSIS — R73.03 PREDIABETES: ICD-10-CM

## 2025-04-04 PROCEDURE — 99495 TRANSJ CARE MGMT MOD F2F 14D: CPT

## 2025-04-04 RX ORDER — ASPIRIN 81 MG/1
81 TABLET, CHEWABLE ORAL DAILY
COMMUNITY

## 2025-04-04 RX ORDER — ATORVASTATIN CALCIUM 40 MG/1
40 TABLET, FILM COATED ORAL EVERY EVENING
Qty: 90 TABLET | Refills: 0 | Status: SHIPPED | OUTPATIENT
Start: 2025-04-04

## 2025-04-04 RX ORDER — ASPIRIN 81 MG/1
81 TABLET, CHEWABLE ORAL DAILY
Qty: 90 TABLET | Refills: 0 | Status: SHIPPED | OUTPATIENT
Start: 2025-04-04 | End: 2025-04-04

## 2025-04-04 NOTE — ASSESSMENT & PLAN NOTE
Continue aspirin 81 mg daily  Continue atorvastatin 40 mg daily  Continue following with ophthalmology  Per OP ophthalmology for ocular hypertension start eye drops Cosopt BID, Brimonidine BID and latanoprost qhs   Patient advised to schedule follow-up with cardiology for Holter monitor and neurology for posthospital visit  Proceed to ER for any changes to vision or new strokelike symptoms  Orders:    Ambulatory Referral to Cardiology; Future    Ambulatory Referral to Neurology; Future

## 2025-04-04 NOTE — PATIENT INSTRUCTIONS
- Chest Xray 2 weeks  - Schedule follow ups with neurology and cardiology   -Labs 3 months prior to follow up with Dr. Myrick

## 2025-04-04 NOTE — ASSESSMENT & PLAN NOTE
.  BP well-controlled on amlodipine and lisinopril  Orders:    Comprehensive metabolic panel; Future    CBC and differential; Future

## 2025-04-04 NOTE — PROGRESS NOTES
Transition of Care Visit:  Name: Sabina Trotter      : 1945      MRN: [id number]  Encounter Provider: Merlene Harrison PA-C  Encounter Date: 2025   Encounter department: [facility] [place]    Assessment & Plan  Encounter for support and coordination of transition of care  Hospitalization and discharge summary reviewed       Central retinal vein occlusion of right eye, unspecified complication status  Continue aspirin 81 mg daily  Continue atorvastatin 40 mg daily  Continue following with ophthalmology  Per OP ophthalmology for ocular hypertension start eye drops Cosopt BID, Brimonidine BID and latanoprost qhs   Patient advised to schedule follow-up with cardiology for Holter monitor and neurology for posthospital visit  Proceed to ER for any changes to vision or new strokelike symptoms  Orders:    Ambulatory Referral to Cardiology; Future    Ambulatory Referral to Neurology; Future    Essential hypertension  .  BP well-controlled on amlodipine and lisinopril  Orders:    Comprehensive metabolic panel; Future    CBC and differential; Future    Hypothyroidism due to acquired atrophy of thyroid  Last TSH WNL, continue levothyroxine 88 mcg daily  Orders:    TSH, 3rd generation with Free T4 reflex; Future    Rheumatoid arthritis of multiple sites with negative rheumatoid factor (HCC)  Following with rheumatology  Continue methotrexate 2.5 mg weekly on        Mixed hyperlipidemia  Patient changed from lovastatin to atorvastatin 40 mg daily  Update lipid panel 3 months, LDL goal <70  Orders:    atorvastatin (LIPITOR) 40 mg tablet; Take 1 tablet (40 mg total) by mouth every evening    Lipid Panel with Direct LDL reflex; Future    Abnormal CT scan  Few scattered opacities in the left upper lobe, correlate clinically for mild bronchopneumonia and recommend follow-up to resolution.  Will order follow-up chest x-ray 2 weeks.  Patient endorses dry cough, intermittent.  She is  "otherwise asymptomatic  Lungs clear to auscultation today  Advised to follow-up in office if she develops any URI symptoms  Orders:    XR chest pa and lateral; Future    Prediabetes  A1c 6.0.  Discussed management of blood sugar for stroke risk reduction  Continue healthy diet and exercise habits.  If begins to trend upward, would recommend starting medication such as metformin  Repeat A1c 3 months  Orders:    Hemoglobin A1C; Future         History of Present Illness     Transitional Care Management Review:   Sabina Trotter is a 79 y.o. female here for TCM follow up.     During the TCM phone call patient stated:  TCM Call (since 3/21/2025)       Date and time call was made  3/24/2025  9:05 AM    Hospital care reviewed  Records reviewed    Patient was hospitialized at  [facility]    Date of Admission  03/18/25    Date of discharge  03/21/25    Diagnosis  central retinal vein occlusion of right eye    Disposition  Home    Were the patients medications reviewed and updated  No    Current Symptoms  Loose Stools          TCM Call (since 3/21/2025)       Post hospital issues  None    Scheduled for follow up?  Yes    Did you obtain your prescribed medications  Yes    Do you need help managing your prescriptions or medications  No    Is transportation to your appointment needed  No    I have advised the patient to call PCP with any new or worsening symptoms  Austen Mcdonnell CMA    Living Arrangements  Spouse or Significiant other    Support System  Spouse    Do you have social support  Yes, as much as I need          Patient is a 79-year-old female presenting to the office for TCM  She was admitted to [facility] from 3/18/2025 to 3/21/2025 for CRVO of right eye    \"HPI: Sabina Trotter is a 79 y.o. female with pertinent PMH of hypertension, hyperlipidemia, RA, hypothyroidism. She presented to the ED on 03/18 for decreased visual acuity of the right eye for the past week and a half.  She states that she has " "had decreased visual acuity in the right eye for at least the past year however an acute worsening at that time.  She also has a history of macular edema of the right eye as well as a prior vitreous hemorrhage status post vitrectomy, posterior uveitis and ocular hypertension.  She was evaluated by her ophthalmologist who diagnosed her with central retinal vein occlusion with possible central retinal artery occlusion as well.  She was advised to go to the ED for neurology workup to rule out stroke.       ED course: initial BP Blood Pressure: 161/90, FSBG POC Glucose: 95. CTA H/N no acute intracranial abnormalities, no LVO or high-grade stenosis.   - Home Antiplatelet /Anticoagulants PTA: No antiplatelet or anticoagulants  - Stroke risk factors: HTN and HLD  - Prior Stroke Hx: none  - Past Neurological Hx: no neurological problems    Summary of Hospital Course:  During her hospital course, CTA H/H was performed on 03/18/25 which showed no acute intracranial abnormalities, no LOV or high-grade stenosis. ECHO performed on 03/19/25 showed normal left ventricular cavity size, normal wall thickness, left ventricular ejection fraction of 60%, and normal systolic function, wall motion, and diastolic function.      She was admitted in the hospital under the stroke pathway with telemetry monitoring, with frequent neuro checks per protocol. She was started on antiplatelet agent aspirin 81 mg daily, her home statin therapy was changed from lovastatin 20 mg daily to atorvastatin 40 mg QHS. For DVT prophylaxis she was placed on Lovenox and SCDs. Ophthalmology was consulted and for her ocular hypertension, she was started on eye drops Cosopt BID, Brimonidine BID, and latanoprost QHS.  MRI of the brain was negative for acute ischemia.  MRI of the brain was negative for acute ischemia.  Temporal artery ultrasounds were also negative for GCA and aneurysm\"    Patient overall reports feeling well today.  She has follow-up with her " ophthalmologist on 4/21/2025.  She did not yet schedule follow-ups with cardiology and neurology  She denies changes to vision, with the exception of vision loss due to the above events.  She denies headaches, dizziness, lightheadedness, or any new strokelike symptoms            Review of Systems   Constitutional:  Negative for chills, fatigue and fever.   HENT:  Negative for congestion, ear pain, postnasal drip, rhinorrhea, sinus pressure, sore throat and trouble swallowing.    Eyes:  Positive for visual disturbance. Negative for pain.   Respiratory:  Negative for cough, shortness of breath and wheezing.    Cardiovascular:  Negative for chest pain, palpitations and leg swelling.   Gastrointestinal:  Negative for abdominal pain, constipation, diarrhea, nausea and vomiting.   Genitourinary:  Negative for difficulty urinating, dysuria, frequency, hematuria and urgency.   Musculoskeletal:  Negative for arthralgias and back pain.   Skin:  Negative for color change, rash and wound.   Neurological:  Negative for dizziness, weakness, light-headedness, numbness and headaches.   Psychiatric/Behavioral:  Negative for dysphoric mood and sleep disturbance. The patient is not nervous/anxious.    All other systems reviewed and are negative.    Objective   /76 (BP Location: Left arm, Patient Position: Sitting, Cuff Size: Standard)   Pulse 64   Temp 98.2 °F (36.8 °C) (Temporal)   Ht 5' (1.524 m)   Wt 56.7 kg (125 lb)   SpO2 98%   BMI 24.41 kg/m²     Physical Exam  Vitals and nursing note reviewed.   Constitutional:       General: She is not in acute distress.     Appearance: Normal appearance. She is not ill-appearing.   HENT:      Head: Normocephalic and atraumatic.      Right Ear: Tympanic membrane, ear canal and external ear normal.      Left Ear: Tympanic membrane, ear canal and external ear normal.      Nose: Nose normal.      Mouth/Throat:      Mouth: Mucous membranes are moist.      Pharynx: Oropharynx is clear.    Eyes:      General: No scleral icterus.     Conjunctiva/sclera: Conjunctivae normal.      Pupils: Pupils are unequal.      Right eye: Pupil is not reactive.      Left eye: Pupil is reactive.      Comments: Right pupil reactive to consensual light, not direct light   Cardiovascular:      Rate and Rhythm: Normal rate and regular rhythm.      Heart sounds: Normal heart sounds. No murmur heard.     No friction rub. No gallop.   Pulmonary:      Effort: Pulmonary effort is normal. No respiratory distress.      Breath sounds: Normal breath sounds. No wheezing, rhonchi or rales.   Chest:      Chest wall: No tenderness.   Skin:     General: Skin is warm and dry.      Coloration: Skin is not pale.      Findings: No erythema, lesion or rash.   Neurological:      Mental Status: She is alert and oriented to person, place, and time.      Cranial Nerves: Cranial nerve deficit present. No facial asymmetry.      Sensory: Sensation is intact.      Motor: Motor function is intact. No weakness.      Coordination: Coordination is intact. Coordination normal.      Gait: Gait is intact.   Psychiatric:         Mood and Affect: Mood normal.         Behavior: Behavior normal.       Medications have been reviewed by provider in current encounter        Disclaimer: This note was generated with voice recognition software.  Phonetic, grammatical, and spelling errors may be present as a result.  Please contact provider with any concerns or questions

## 2025-04-04 NOTE — ASSESSMENT & PLAN NOTE
Last TSH WNL, continue levothyroxine 88 mcg daily  Orders:    TSH, 3rd generation with Free T4 reflex; Future

## 2025-04-04 NOTE — ASSESSMENT & PLAN NOTE
Patient changed from lovastatin to atorvastatin 40 mg daily  Update lipid panel 3 months, LDL goal <70  Orders:    atorvastatin (LIPITOR) 40 mg tablet; Take 1 tablet (40 mg total) by mouth every evening    Lipid Panel with Direct LDL reflex; Future

## 2025-04-16 ENCOUNTER — TELEPHONE (OUTPATIENT)
Dept: OTHER | Facility: HOSPITAL | Age: 80
End: 2025-04-16

## 2025-04-16 NOTE — TELEPHONE ENCOUNTER
Patient called for Atorvastatin 40mg refill - advised E-Prescribing Status: Receipt confirmed by pharmacy (4/4/2025 10:43 AM EDT) at [facility]  She will contact pharmacy for the order

## 2025-04-17 NOTE — PROGRESS NOTES
Cardiology Consultation     Sabina Trotter  [id number]  1945  HEART [facility]ASCULAR [facility]  [address] [place] [address]  KE[place] DESIR [id number]  1. Primary hypertension  POCT ECG    Zio Monitor      2. Pure hypercholesterolemia  Zio Monitor      3. Central retinal vein occlusion of right eye, unspecified complication status  Ambulatory Referral to Cardiology    Zio Monitor      4. Cerebral infarction, unspecified mechanism (HCC)  Zio Monitor        Patient Active Problem List   Diagnosis   • Dense breast   • Hypothyroidism due to acquired atrophy of thyroid   • Mixed hyperlipidemia   • Rheumatoid arthritis of multiple sites with negative rheumatoid factor (HCC)   • Epistaxis   • Essential hypertension   • Varicose veins of bilateral lower extremities with other complications   • Central retinal vein occlusion of right eye       HPI patient is here for cardiology evaluation.  She is referred by her PCP.  She had been diagnosed with central retinal vein occlusion of the R eye with possible central retinal artery occlusion.  She presented with decreased visual acuity of the R eye to her ophthalmologist.  She was referred to the ED..  She was advised to schedule follow-up with Cardiology in reference to a .  She was advised to follow-up with Neurology.  Patient has HTN, hypothyroidism, HLD, prediabetes and rheumatoid arthritis.  Patient was started on ASA 81 mg/day. Echocardiogram 3/19/2025 demonstrated LVEF of 60% with no significant valve disease.  A Zio patch was ordered by Neurology service.  CTA of the head/neck 3/18/2025 demonstrated no large vessel occlusion in the head or neck.  Brain MRI 3/20/2025 demonstrated no acute pathology.  Patient has had no CP or significant dyspnea.  Her VS are stable.  In reference to FH her dad had a heart attack at the age of 81.  Patient is a non-smoker.  EKG today demonstrates NSR and is a normal  tracing.    [facility]-  Past Medical History:   Diagnosis Date   • Allergic 1952   • Arthritis 2007   • Cataract    • Disease of thyroid gland     hypothyrodism    • HL (hearing loss)    • Hypertension    • Lumbago    • Pure hypercholesterolemia    • Rheumatoid arthritis (HCC)    • Stenosis of rectum and anus         SOCIAL HISTORY-  Social History     Socioeconomic History   • Marital status: /Civil Union     Spouse name: Not on file   • Number of children: Not on file   • Years of education: Not on file   • Highest education level: Not on file   Occupational History   • Not on file   Tobacco Use   • Smoking status: Never   • Smokeless tobacco: Never   Vaping Use   • Vaping status: Never Used   Substance and Sexual Activity   • Alcohol use: Not Currently     Comment: 1 drink new years oren   • Drug use: No   • Sexual activity: Not Currently   Other Topics Concern   • Not on file   Social History Narrative   • Not on file     Social Drivers of Health     Financial Resource Strain: Low Risk  (5/17/2023)    Overall Financial Resource Strain (CARDIA)    • Difficulty of Paying Living Expenses: Not hard at all   Food Insecurity: No Food Insecurity (3/19/2025)    Nursing - Inadequate Food Risk Classification    • Worried About Running Out of Food in the Last Year: Never true    • Ran Out of Food in the Last Year: Never true    • Ran Out of Food in the Last Year: Never true   Transportation Needs: No Transportation Needs (3/19/2025)    Nursing - Transportation Risk Classification    • Lack of Transportation: Not on file    • Lack of Transportation: No   Physical Activity: Not on file   Stress: Not on file   Social Connections: Not on file   Intimate Partner Violence: Unknown (3/19/2025)    Nursing IPS    • Feels Physically and Emotionally Safe: Not on file    • Physically Hurt by Someone: Not on file    • Humiliated or Emotionally Abused by Someone: Not on file    • Physically Hurt by Someone: No    • Hurt or Threatened by  Someone: No   Housing Stability: Unknown (3/19/2025)    Nursing: Inadequate Housing Risk Classification    • Has Housing: Not on file    • Worried About Losing Housing: Not on file    • Unable to Get Utilities: Not on file    • Unable to Pay for Housing in the Last Year: No    • Has Housin        FAMILY HISTORY-  Family History   Problem Relation Age of Onset   • Breast cancer Mother 54   • Thyroid disease Mother    • Hearing loss Father    • Heart disease Father    • Prostate cancer Brother 69   • Breast cancer Maternal Aunt 60   • Prostate cancer Paternal Uncle 80   • Endometrial cancer Cousin 58   • No Known Problems Daughter    • No Known Problems Maternal Grandmother    • No Known Problems Maternal Grandfather    • No Known Problems Paternal Grandmother    • No Known Problems Paternal Grandfather    • No Known Problems Daughter        SURGICAL HISTORY-  Past Surgical History:   Procedure Laterality Date   • CATARACT EXTRACTION Bilateral    • CERVIX SURGERY     • HEMORROIDECTOMY     • MAMMO (HISTORICAL)      [facility]   • MENISCECTOMY     • OH COLONOSCOPY FLX DX W/COLLJ SPEC WHEN PFRMD N/A 2017    Procedure: COLONOSCOPY;  Surgeon: Neyda Palencia MD;  Location: [facility];  Service: Colorectal         Current Outpatient Medications:   •  amLODIPine (NORVASC) 5 mg tablet, take 1 tablet by mouth once daily, Disp: 90 tablet, Rfl: 1  •  aspirin 81 mg chewable tablet, Chew 81 mg daily, Disp: , Rfl:   •  atorvastatin (LIPITOR) 40 mg tablet, Take 1 tablet (40 mg total) by mouth every evening, Disp: 90 tablet, Rfl: 0  •  Cholecalciferol (Vitamin D-3) 25 MCG (1000 UT) CAPS, Take by mouth in the morning, Disp: , Rfl:   •  dorzolamide-timolol (COSOPT) 2-0.5 % ophthalmic solution, Administer 1 drop to the right eye every 12 (twelve) hours, Disp: 10 mL, Rfl: 0  •  folic acid (FOLVITE) 1 mg tablet, Take 800 mcg by mouth 4 (four) times a week , Disp: , Rfl:   •  latanoprost (XALATAN) 0.005 % ophthalmic solution,  Administer 1 drop to the right eye daily at bedtime, Disp: 2.5 mL, Rfl: 0  •  levothyroxine 88 mcg tablet, take 1 tablet by mouth once daily, Disp: 90 tablet, Rfl: 1  •  lisinopril (ZESTRIL) 20 mg tablet, take 1 tablet by mouth once daily, Disp: 90 tablet, Rfl: 1  •  methotrexate 2.5 mg tablet, Take 15 mg by mouth once a week , Disp: , Rfl:   •  mirtazapine (REMERON) 7.5 MG tablet, take 1 tablet by mouth at bedtime, Disp: 90 tablet, Rfl: 1  •  Multiple Minerals-Vitamins (CITRACAL PLUS PO), Take by mouth 200 mg daily, Disp: , Rfl:   •  multivitamin (THERAGRAN) TABS, Take 1 tablet by mouth 4 (four) times a week, Disp: , Rfl:   •  brimonidine tartrate 0.2 % ophthalmic solution, Administer 1 drop to the right eye every 12 (twelve) hours (Patient not taking: Reported on 4/25/2025), Disp: 5 mL, Rfl: 0  Allergies   Allergen Reactions   • Penicillins Hives     Vitals:    04/25/25 1342   BP: 140/78   BP Location: Left arm   Patient Position: Sitting   Cuff Size: Standard   Pulse: 74   SpO2: 98%   Weight: 56.7 kg (124 lb 14.4 oz)   Height: 5' (1.524 m)       Review of Systems:  Review of Systems   All other systems reviewed and are negative.    Physical Exam:  Physical Exam  Vitals reviewed.   Constitutional:       Appearance: She is well-developed.   HENT:      Head: Normocephalic and atraumatic.   Eyes:      Conjunctiva/sclera: Conjunctivae normal.   Cardiovascular:      Rate and Rhythm: Normal rate.      Heart sounds: Normal heart sounds.   Pulmonary:      Effort: Pulmonary effort is normal.      Breath sounds: Normal breath sounds.   Musculoskeletal:      Cervical back: Normal range of motion and neck supple.   Skin:     General: Skin is warm and dry.   Neurological:      Mental Status: She is alert and oriented to person, place, and time.       Discussion/Summary: Patient recently admitted with central retinal vein occlusion of the right eye with possible central retinal artery occlusion.  Neurology has concerned about  embolic event.  They have recommended a Zio patch.  This had been ordered previously but not received by patient.  I will order this again.  We will look for evidence of atrial fibrillation.  I have asked her to call if there is a problem in the interim.  I will see her in follow-up in 4 to 6 months and sooner as is necessary.

## 2025-04-18 ENCOUNTER — APPOINTMENT (OUTPATIENT)
Dept: RADIOLOGY | Age: 80
End: 2025-04-18
Payer: MEDICARE

## 2025-04-18 DIAGNOSIS — R93.89 ABNORMAL CT SCAN: ICD-10-CM

## 2025-04-18 PROCEDURE — 71046 X-RAY EXAM CHEST 2 VIEWS: CPT

## 2025-04-24 ENCOUNTER — RESULTS FOLLOW-UP (OUTPATIENT)
Dept: INTERNAL MEDICINE CLINIC | Facility: OTHER | Age: 80
End: 2025-04-24

## 2025-04-25 ENCOUNTER — CONSULT (OUTPATIENT)
Dept: CARDIOLOGY CLINIC | Facility: CLINIC | Age: 80
End: 2025-04-25
Payer: MEDICARE

## 2025-04-25 VITALS
HEIGHT: 60 IN | SYSTOLIC BLOOD PRESSURE: 140 MMHG | HEART RATE: 74 BPM | WEIGHT: 124.9 LBS | DIASTOLIC BLOOD PRESSURE: 78 MMHG | BODY MASS INDEX: 24.52 KG/M2 | OXYGEN SATURATION: 98 %

## 2025-04-25 DIAGNOSIS — I63.9 CEREBRAL INFARCTION, UNSPECIFIED MECHANISM (HCC): ICD-10-CM

## 2025-04-25 DIAGNOSIS — E78.00 PURE HYPERCHOLESTEROLEMIA: ICD-10-CM

## 2025-04-25 DIAGNOSIS — I10 PRIMARY HYPERTENSION: Primary | ICD-10-CM

## 2025-04-25 DIAGNOSIS — H34.8112 CENTRAL RETINAL VEIN OCCLUSION OF RIGHT EYE, UNSPECIFIED COMPLICATION STATUS: ICD-10-CM

## 2025-04-25 LAB
ATRIAL RATE: 74 BPM
P AXIS: 78 DEGREES
PR INTERVAL: 166 MS
QRS AXIS: 25 DEGREES
QRSD INTERVAL: 66 MS
QT INTERVAL: 394 MS
QTC INTERVAL: 437 MS
T WAVE AXIS: 67 DEGREES
VENTRICULAR RATE: 74 BPM

## 2025-04-25 PROCEDURE — 99204 OFFICE O/P NEW MOD 45 MIN: CPT | Performed by: INTERNAL MEDICINE

## 2025-04-25 PROCEDURE — 93000 ELECTROCARDIOGRAM COMPLETE: CPT | Performed by: INTERNAL MEDICINE

## 2025-05-14 DIAGNOSIS — E03.9 HYPOTHYROIDISM, UNSPECIFIED TYPE: ICD-10-CM

## 2025-05-15 RX ORDER — LEVOTHYROXINE SODIUM 88 UG/1
88 TABLET ORAL DAILY
Qty: 90 TABLET | Refills: 1 | Status: SHIPPED | OUTPATIENT
Start: 2025-05-15

## 2025-05-29 LAB
CV ZIO BASELINE AVG BPM: 75 BPM
CV ZIO BASELINE BPM HIGH: 162 BPM
CV ZIO BASELINE BPM LOW: 50 BPM
CV ZIO DEVICE ANALYSIS TIME: NORMAL
CV ZIO ECT SVE COUNT: 488 EPISODES
CV ZIO ECT SVE CPLT COUNT: 36 EPISODES
CV ZIO ECT SVE CPLT FREQ: NORMAL
CV ZIO ECT SVE FREQ: NORMAL
CV ZIO ECT SVE TPLT COUNT: 15 EPISODES
CV ZIO ECT SVE TPLT FREQ: NORMAL
CV ZIO ECT VE COUNT: 234 EPISODES
CV ZIO ECT VE CPLT COUNT: 0 EPISODES
CV ZIO ECT VE CPLT FREQ: 0
CV ZIO ECT VE FREQ: NORMAL
CV ZIO ECT VE TPLT COUNT: 0 EPISODES
CV ZIO ECT VE TPLT FREQ: 0
CV ZIO ECTOPIC SVE COUPLET RAW PERCENT: 0 %
CV ZIO ECTOPIC SVE ISOLATED PERCENT: 0.03 %
CV ZIO ECTOPIC SVE TRIPLET RAW PERCENT: 0 %
CV ZIO ECTOPIC VE COUPLET RAW PERCENT: 0 %
CV ZIO ECTOPIC VE ISOLATED PERCENT: 0.02 %
CV ZIO ECTOPIC VE TRIPLET RAW PERCENT: 0 %
CV ZIO ENROLLMENT END: NORMAL
CV ZIO ENROLLMENT START: NORMAL
CV ZIO PATIENT EVENTS DIARIES: 0
CV ZIO PATIENT EVENTS TRIGGERS: 0
CV ZIO PAUSE COUNT: 0
CV ZIO PRESCRIPTION STATUS: NORMAL
CV ZIO SVT AVG BPM: 123 BPM
CV ZIO SVT BPM HIGH: 162 BPM
CV ZIO SVT BPM LOW: 84 BPM
CV ZIO SVT COUNT: 12
CV ZIO SVT F EPI AVG BPM: 154 BPM
CV ZIO SVT F EPI BEATS: 7 BEATS
CV ZIO SVT F EPI BPM HIGH: 162 BPM
CV ZIO SVT F EPI BPM LOW: 143 BPM
CV ZIO SVT F EPI DUR: 2.8 SEC
CV ZIO SVT F EPI END: NORMAL
CV ZIO SVT F EPI START: NORMAL
CV ZIO SVT L EPI AVG BPM: 98 BPM
CV ZIO SVT L EPI BEATS: 6 BEATS
CV ZIO SVT L EPI BPM HIGH: 113 BPM
CV ZIO SVT L EPI BPM LOW: 84 BPM
CV ZIO SVT L EPI DUR: 3.9 SEC
CV ZIO SVT L EPI END: NORMAL
CV ZIO SVT L EPI START: NORMAL
CV ZIO TOTAL  ENROLLMENT PERIOD: NORMAL
CV ZIO VT COUNT: 0

## 2025-06-02 DIAGNOSIS — I10 ESSENTIAL HYPERTENSION: ICD-10-CM

## 2025-06-03 RX ORDER — AMLODIPINE BESYLATE 5 MG/1
5 TABLET ORAL DAILY
Qty: 90 TABLET | Refills: 1 | Status: SHIPPED | OUTPATIENT
Start: 2025-06-03

## 2025-06-13 ENCOUNTER — RESULTS FOLLOW-UP (OUTPATIENT)
Dept: CARDIOLOGY CLINIC | Facility: CLINIC | Age: 80
End: 2025-06-13

## 2025-06-17 NOTE — TELEPHONE ENCOUNTER
Spoke with Sabina and relayed message as given per Dr. Larios. Will further review results at next ov. Sabina is aware and understood.

## 2025-06-17 NOTE — TELEPHONE ENCOUNTER
----- Message from Javier Larios MD sent at 6/13/2025 12:46 PM EDT -----  Please call her.  Her monitor look fine.  There was no evidence of significant arrhythmia.  Thank you.  ----- Message -----  From: Javier Larios MD  Sent: 6/13/2025  12:43 PM EDT  To: Javier Larios MD

## 2025-06-26 LAB
ALBUMIN SERPL-MCNC: 4.1 G/DL (ref 3.6–5.1)
ALBUMIN/GLOB SERPL: 1.9 (CALC) (ref 1–2.5)
ALP SERPL-CCNC: 75 U/L (ref 37–153)
ALT SERPL-CCNC: 11 U/L (ref 6–29)
AST SERPL-CCNC: 17 U/L (ref 10–35)
BASOPHILS # BLD AUTO: 91 CELLS/UL (ref 0–200)
BASOPHILS NFR BLD AUTO: 1.3 %
BILIRUB SERPL-MCNC: 0.6 MG/DL (ref 0.2–1.2)
BUN SERPL-MCNC: 13 MG/DL (ref 7–25)
BUN/CREAT SERPL: NORMAL (CALC) (ref 6–22)
CALCIUM SERPL-MCNC: 8.9 MG/DL (ref 8.6–10.4)
CHLORIDE SERPL-SCNC: 104 MMOL/L (ref 98–110)
CHOLEST SERPL-MCNC: 144 MG/DL
CHOLEST/HDLC SERPL: 2.4 (CALC)
CO2 SERPL-SCNC: 28 MMOL/L (ref 20–32)
CREAT SERPL-MCNC: 0.63 MG/DL (ref 0.6–1)
EOSINOPHIL # BLD AUTO: 238 CELLS/UL (ref 15–500)
EOSINOPHIL NFR BLD AUTO: 3.4 %
ERYTHROCYTE [DISTWIDTH] IN BLOOD BY AUTOMATED COUNT: 14.4 % (ref 11–15)
GFR/BSA.PRED SERPLBLD CYS-BASED-ARV: 90 ML/MIN/1.73M2
GLOBULIN SER CALC-MCNC: 2.2 G/DL (CALC) (ref 1.9–3.7)
GLUCOSE SERPL-MCNC: 81 MG/DL (ref 65–99)
HBA1C MFR BLD: 5.8 %
HCT VFR BLD AUTO: 38 % (ref 35–45)
HDLC SERPL-MCNC: 59 MG/DL
HGB BLD-MCNC: 12.2 G/DL (ref 11.7–15.5)
LDLC SERPL CALC-MCNC: 72 MG/DL (CALC)
LYMPHOCYTES # BLD AUTO: 1134 CELLS/UL (ref 850–3900)
LYMPHOCYTES NFR BLD AUTO: 16.2 %
MCH RBC QN AUTO: 29.2 PG (ref 27–33)
MCHC RBC AUTO-ENTMCNC: 32.1 G/DL (ref 32–36)
MCV RBC AUTO: 90.9 FL (ref 80–100)
MONOCYTES # BLD AUTO: 728 CELLS/UL (ref 200–950)
MONOCYTES NFR BLD AUTO: 10.4 %
NEUTROPHILS # BLD AUTO: 4809 CELLS/UL (ref 1500–7800)
NEUTROPHILS NFR BLD AUTO: 68.7 %
NONHDLC SERPL-MCNC: 85 MG/DL (CALC)
PLATELET # BLD AUTO: 285 THOUSAND/UL (ref 140–400)
PMV BLD REES-ECKER: 9.2 FL (ref 7.5–12.5)
POTASSIUM SERPL-SCNC: 4.1 MMOL/L (ref 3.5–5.3)
PROT SERPL-MCNC: 6.3 G/DL (ref 6.1–8.1)
RBC # BLD AUTO: 4.18 MILLION/UL (ref 3.8–5.1)
SODIUM SERPL-SCNC: 139 MMOL/L (ref 135–146)
TRIGL SERPL-MCNC: 54 MG/DL
TSH SERPL-ACNC: 2.03 MIU/L (ref 0.4–4.5)
WBC # BLD AUTO: 7 THOUSAND/UL (ref 3.8–10.8)

## 2025-07-08 ENCOUNTER — OFFICE VISIT (OUTPATIENT)
Age: 80
End: 2025-07-08
Payer: MEDICARE

## 2025-07-08 VITALS
BODY MASS INDEX: 23.95 KG/M2 | HEART RATE: 76 BPM | TEMPERATURE: 98.5 F | WEIGHT: 122 LBS | DIASTOLIC BLOOD PRESSURE: 66 MMHG | SYSTOLIC BLOOD PRESSURE: 118 MMHG | OXYGEN SATURATION: 97 % | HEIGHT: 60 IN

## 2025-07-08 DIAGNOSIS — E03.4 HYPOTHYROIDISM DUE TO ACQUIRED ATROPHY OF THYROID: ICD-10-CM

## 2025-07-08 DIAGNOSIS — Z11.59 ENCOUNTER FOR HEPATITIS C SCREENING TEST FOR LOW RISK PATIENT: ICD-10-CM

## 2025-07-08 DIAGNOSIS — E78.2 MIXED HYPERLIPIDEMIA: ICD-10-CM

## 2025-07-08 DIAGNOSIS — M06.09 RHEUMATOID ARTHRITIS OF MULTIPLE SITES WITH NEGATIVE RHEUMATOID FACTOR (HCC): ICD-10-CM

## 2025-07-08 DIAGNOSIS — I10 ESSENTIAL HYPERTENSION: ICD-10-CM

## 2025-07-08 DIAGNOSIS — Z00.00 MEDICARE ANNUAL WELLNESS VISIT, SUBSEQUENT: Primary | ICD-10-CM

## 2025-07-08 DIAGNOSIS — I83.893 VARICOSE VEINS OF BILATERAL LOWER EXTREMITIES WITH OTHER COMPLICATIONS: ICD-10-CM

## 2025-07-08 PROCEDURE — G0439 PPPS, SUBSEQ VISIT: HCPCS | Performed by: INTERNAL MEDICINE

## 2025-07-08 PROCEDURE — 99214 OFFICE O/P EST MOD 30 MIN: CPT | Performed by: INTERNAL MEDICINE

## 2025-07-08 RX ORDER — ATORVASTATIN CALCIUM 40 MG/1
40 TABLET, FILM COATED ORAL EVERY EVENING
Qty: 90 TABLET | Refills: 1 | Status: SHIPPED | OUTPATIENT
Start: 2025-07-08

## 2025-07-08 NOTE — PROGRESS NOTES
Name: Sabina Trotter      : 1945      MRN: [id number]  Encounter Provider: Renetta Myrick MD  Encounter Date: 2025   Encounter department: [facility] [place]    :  Assessment & Plan  Rheumatoid arthritis of multiple sites with negative rheumatoid factor (HCC)    Mixed hyperlipidemia    Hypothyroidism due to acquired atrophy of thyroid    Essential hypertension    Varicose veins of bilateral lower extremities with other complications    Encounter for hepatitis C screening test for low risk patient    Medicare annual wellness visit, subsequent      Assessment & Plan           Preventive health issues were discussed with patient, and age appropriate screening tests were ordered as noted in patient's After Visit Summary. Personalized health advice and appropriate referrals for health education or preventive services given if needed, as noted in patient's After Visit Summary.      No chief complaint on file.     History of Present Illness   {?Quick Links Encounters * My Last Note * Last Note in Specialty * Snapshot * Since Last Visit * History :19100}  History of Present Illness      Patient Care Team:  Renetta Myrick MD as PCP - General (Internal Medicine)  Neyda Palencia MD as Endoscopist  Nelson Joaquin MD (Rheumatology)  Billy Marc MD (Ophthalmology)    Review of Systems  Annual Wellness Visit Questionnaire   Sabina is here for her Subsequent Wellness visit.     Health Risk Assessment:   Patient rates overall health as good. Patient feels that their physical health rating is slightly worse. Patient is satisfied with their life. Eyesight was rated as slightly worse. Hearing was rated as slightly worse. Patient feels that their emotional and mental health rating is same. Patients states they are never, rarely angry. Patient states they are sometimes unusually tired/fatigued. Pain experienced in the last 7 days has been none. Patient states that she has  experienced no weight loss or gain in last 6 months.     Fall Risk Screening:   In the past year, patient has experienced: no history of falling in past year      Urinary Incontinence Screening:   Patient has not leaked urine accidently in the last six months.     Home Safety:  Patient does not have trouble with stairs inside or outside of their home. Patient has working smoke alarms and has no working carbon monoxide detector. Home safety hazards include: none.     Nutrition:   Current diet is Regular.     Medications:   Patient is not currently taking any over-the-counter supplements. Patient is able to manage medications.     Activities of Daily Living (ADLs)/Instrumental Activities of Daily Living (IADLs):   Walk and transfer into and out of bed and chair?: Yes  Dress and groom yourself?: Yes    Bathe or shower yourself?: Yes    Feed yourself? Yes  Do your laundry/housekeeping?: Yes  Manage your money, pay your bills and track your expenses?: Yes  Make your own meals?: Yes    Do your own shopping?: Yes    Durable Medical Equipment Suppliers  none    Previous Hospitalizations:   Any hospitalizations or ED visits within the last 12 months?: Yes    How many hospitalizations have you had in the last year?: 1-2    Advance Care Planning:   Living will: Yes    Durable POA for healthcare: Yes    Advanced directive: Yes      Preventive Screenings      Cardiovascular Screening:    General: Screening Not Indicated and History Lipid Disorder      Diabetes Screening:     General: Screening Current      Colorectal Cancer Screening:     General: Screening Current      Breast Cancer Screening:     General: Screening Current      Cervical Cancer Screening:    General: Screening Not Indicated      Lung Cancer Screening:     General: Screening Not Indicated    Screening, Brief Intervention, and Referral to Treatment (SBIRT)     Screening  Typical number of drinks in a day: 0  Typical number of drinks in a week: 0  Interpretation:  Low risk drinking behavior.    AUDIT-C Screenin) How often did you have a drink containing alcohol in the past year? never  2) How many drinks did you have on a typical day when you were drinking in the past year? 0  3) How often did you have 6 or more drinks on one occasion in the past year? never    AUDIT-C Score: 0  Interpretation: Score 0-2 (female): Negative screen for alcohol misuse    Single Item Drug Screening:  How often have you used an illegal drug (including marijuana) or a prescription medication for non-medical reasons in the past year? never    Single Item Drug Screen Score: 0  Interpretation: Negative screen for possible drug use disorder    Social Drivers of Health     Financial Resource Strain: Low Risk  (2023)    Overall Financial Resource Strain (CARDIA)    • Difficulty of Paying Living Expenses: Not hard at all   Food Insecurity: No Food Insecurity (2025)    Nursing - Inadequate Food Risk Classification    • Worried About Running Out of Food in the Last Year: Never true    • Ran Out of Food in the Last Year: Never true    • Ran Out of Food in the Last Year: Never true   Transportation Needs: No Transportation Needs (2025)    PRAPARE - Transportation    • Lack of Transportation (Medical): No    • Lack of Transportation (Non-Medical): No   Housing Stability: Low Risk  (2025)    Housing Stability Vital Sign    • Unable to Pay for Housing in the Last Year: No    • Number of Times Moved in the Last Year: 0    • Homeless in the Last Year: No   Utilities: Not At Risk (2025)    [facility] Utilities    • Threatened with loss of utilities: No     No results found.    Objective {?Quick Links Trend Vitals * Enter New Vitals * Results Review * Timeline (Adult) * Labs * Imaging * Cardiology * Procedures * Lung Cancer Screening * Surgical eConsent :25441}  There were no vitals taken for this visit.    Physical Exam    Physical Exam  {Administrative / Billing Section (Optional):78052}

## 2025-07-08 NOTE — PROGRESS NOTES
Name: Sabina Trotter      : 1945      MRN: [id number]  Encounter Provider: Renetta Myrick MD  Encounter Date: 2025   [facility]    :  Assessment & Plan  Medicare annual wellness visit, subsequent         Mixed hyperlipidemia    Orders:  •  atorvastatin (LIPITOR) 40 mg tablet; Take 1 tablet (40 mg total) by mouth every evening    Essential hypertension         Hypothyroidism due to acquired atrophy of thyroid         Rheumatoid arthritis of multiple sites with negative rheumatoid factor (HCC)         Varicose veins of bilateral lower extremities with other complications         Encounter for hepatitis C screening test for low risk patient    Orders:  •  Hepatitis C antibody; Future      Assessment & Plan  1. Central retinal vein occlusion.  - Reports blurry vision in the right eye and reduced depth perception.  - Currently using latanoprost drops and Cosopt (dorzolamide) to manage eye pressure.  - Advised to continue these medications and be cautious while driving or walking due to altered depth perception.  - Scheduled to see Dr. Warren for further evaluation.    2. Microvascular disease.  - Recent imaging showed changes consistent with microvascular disease, common in older patients.  - No significant abnormalities found in the brain or arteries.  - Scheduled to see a neurologist in 2025 for further evaluation.  - Discussion about the condition and reassurance provided.    3. Medication management.  - Switched from lovastatin 20 mg to atorvastatin 40 mg during hospital stay in 2025.  - Advised to continue atorvastatin 40 mg as it is a high-intensity statin that improves circulation to the eyes and heart.  - Prescription for atorvastatin 40 mg will be sent to pharmacy.  - No leg cramps or adverse effects reported.    4. Diabetes monitoring.  - Recent blood work shows A1c improved from 6 to 5.8, indicating good control.  - Cholesterol  total is 140, thyroid and blood counts are normal.  - No further action needed for diabetes management at this time.  - Patient reassured about the positive lab results.    Follow-up  - The patient will follow up in 6 months with Dr. Cevallos starting 08/2025.         Chief Complaint   Patient presents with   • Medicare Wellness Visit   • Follow-up     6 months follow up, patient questions/concerns about Atorvastatin medication was increase to 40 mg.      History of Present Illness     History of Present Illness  The patient presents for evaluation of central retinal vein occlusion, microvascular disease, and medication management.    She was recently hospitalized due to an eye condition that was initially suspected to be a stroke. During her stay at [facility] from 03/08/2025 to 03/12/2025, she underwent a CT scan, ultrasound, and MRI. She reports no cough or history of smoking. She has no chronic sinus issues. She is scheduled to see a neurologist in 09/2025. She had blood work done about a week ago.    She experiences blurriness in her right eye, which she attributes to central retinal vein occlusion. Her depth perception is compromised, but her left eye remains unaffected. She is under the care of Dr. Warren for this issue. She is currently using latanoprost drops and Cosopt, but has discontinued brimonidine.    She was previously on lovastatin 20 mg, but this was switched to atorvastatin 40 mg during her hospital stay. She reports no leg cramps.    SOCIAL HISTORY  She does not smoke.     Review of Systems  Objective   /66 (BP Location: Left arm, Patient Position: Sitting, Cuff Size: Standard)   Pulse 76   Temp 98.5 °F (36.9 °C) (Temporal)   Ht 5' (1.524 m)   Wt 55.3 kg (122 lb)   SpO2 97%   BMI 23.83 kg/m²     Physical Exam  Eyes: Blurry vision in the right eye, central retinal vein occlusion.  Gen: Mildly anxious appearing  Heent: atraumatic, normocephalic  Mouth: is moist  Neck: is supple, no  JVD, no carotid bruits.   Heart: is regular Normal s1, s2,  Lungs: are clear to auscultation  Abd: soft, non tender, NABS  Ext: no edema, distal pulses normal  Skin: no rashes, ulcers  Neuro: AAOx3       Answers submitted by the patient for this visit:  Medicare Annual Wellness Visit (Submitted on 7/4/2025)  How would you rate your overall health?: good  Compared to last year, how is your physical health?: slightly worse  In general, how satisfied are you with your life?: satisfied  Compared to last year, how is your eyesight?: slightly worse  Compared to last year, how is your hearing?: slightly worse  Compared to last year, how is your emotional/mental health?: same  How often is anger a problem for you?: never, rarely  How often do you feel unusually tired/fatigued?: sometimes  In the past 7 days, how much pain have you experienced?: none  In the past 6 months, have you lost or gained 10 pounds without trying?: No  One or more falls in the last year: No  In the past 6 months, have you accidentally leaked urine?: No  Do you have trouble with the stairs inside or outside your home?: No  Does your home have working smoke alarms?: Yes  Does your home have a carbon monoxide monitor?: No  Which safety hazards (if any) have you experienced in your home? Please select all that apply.: none  How would you describe your current diet? Please select all that apply.: Regular  In addition to prescription medications, are you taking any over-the-counter supplements?: No  Can you manage your medications?: Yes  Are you currently taking any opioid medications?: No  Can you walk and transfer into and out of your bed and chair?: Yes  Can you dress and groom yourself?: Yes  Can you bathe or shower yourself?: Yes  Can you feed yourself?: Yes  Can you do your laundry/ housekeeping?: Yes  Can you manage your money, pay your bills, and track your expenses?: Yes  Can you make your own meals?: Yes  Can you do your own shopping?:  Yes  Please list your DME (Durable Medical Equipment) supplier, if you use one.: none  Within the last 12 months, have you had any hospitalizations or Emergency Department visits?: Yes  If yes, how many times have you been hospitalized within the past year?: 1-2  Do you have a living will?: Yes  Do you have a Durable POA (Power of ) for healthcare decisions?: Yes  Do you have an Advanced Directive for end of life decisions?: Yes  How often have you used an illegal drug (including marijuana) or a prescription medication for non-medical reasons in the past year?: never  What is the typical number of drinks you consume in a day?: 0  What is the typical number of drinks you consume in a week?: 0  How often did you have a drink containing alcohol in the past year?: never  How many drinks did you have on a typical day  when you were drinking in the past year?: 0  How often did you have 6 or more drinks on one occasion in the past year?: never

## 2025-07-08 NOTE — PROGRESS NOTES
Name: Sabina Trotter      : 1945      MRN: [id number]  Encounter Provider: Renetta Myrick MD  Encounter Date: 2025   Encounter department: [facility] [place]  :  Assessment & Plan  Medicare annual wellness visit, subsequent         Mixed hyperlipidemia    Orders:  •  atorvastatin (LIPITOR) 40 mg tablet; Take 1 tablet (40 mg total) by mouth every evening    Essential hypertension         Hypothyroidism due to acquired atrophy of thyroid         Rheumatoid arthritis of multiple sites with negative rheumatoid factor (HCC)         Varicose veins of bilateral lower extremities with other complications         Encounter for hepatitis C screening test for low risk patient    Orders:  •  Hepatitis C antibody; Future       Preventive health issues were discussed with patient, and age appropriate screening tests were ordered as noted in patient's After Visit Summary. Personalized health advice and appropriate referrals for health education or preventive services given if needed, as noted in patient's After Visit Summary.    History of Present Illness     HPI   Patient Care Team:  Renetta Myrick MD as PCP - General (Internal Medicine)  Neyda Palencia MD as Endoscopist  Nelson Joaquin MD (Rheumatology)  Billy Marc MD (Ophthalmology)    Review of Systems  Medical History Reviewed by provider this encounter:       Annual Wellness Visit Questionnaire   Sabina is here for her Subsequent Wellness visit. Last Medicare Wellness visit information reviewed, patient interviewed and updates made to the record today.      Health Risk Assessment:   Patient rates overall health as good. Patient feels that their physical health rating is slightly worse. Patient is satisfied with their life. Eyesight was rated as slightly worse. Hearing was rated as slightly worse. Patient feels that their emotional and mental health rating is same. Patients states they are never, rarely angry.  Patient states they are sometimes unusually tired/fatigued. Pain experienced in the last 7 days has been none. Patient states that she has experienced no weight loss or gain in last 6 months.     Depression Screening:   PHQ-2 Score: 0      Fall Risk Screening:   In the past year, patient has experienced: no history of falling in past year      Urinary Incontinence Screening:   Patient has not leaked urine accidently in the last six months.     Home Safety:  Patient does not have trouble with stairs inside or outside of their home. Patient has working smoke alarms and has no working carbon monoxide detector. Home safety hazards include: none.     Nutrition:   Current diet is Regular.     Medications:   Patient is not currently taking any over-the-counter supplements. Patient is able to manage medications.     Activities of Daily Living (ADLs)/Instrumental Activities of Daily Living (IADLs):   Walk and transfer into and out of bed and chair?: Yes  Dress and groom yourself?: Yes    Bathe or shower yourself?: Yes    Feed yourself? Yes  Do your laundry/housekeeping?: Yes  Manage your money, pay your bills and track your expenses?: Yes  Make your own meals?: Yes    Do your own shopping?: Yes    Durable Medical Equipment Suppliers  none    Previous Hospitalizations:   Any hospitalizations or ED visits within the last 12 months?: Yes    How many hospitalizations have you had in the last year?: 1-2    Advance Care Planning:   Living will: Yes    Durable POA for healthcare: Yes    Advanced directive: Yes      Preventive Screenings      Cardiovascular Screening:    General: Screening Not Indicated and History Lipid Disorder      Diabetes Screening:     General: Screening Current      Colorectal Cancer Screening:     General: Screening Current      Breast Cancer Screening:     General: Screening Current      Cervical Cancer Screening:    General: Screening Not Indicated      Lung Cancer Screening:     General: Screening Not  Indicated    Screening, Brief Intervention, and Referral to Treatment (SBIRT)     Screening  Typical number of drinks in a day: 0  Typical number of drinks in a week: 0  Interpretation: Low risk drinking behavior.    AUDIT-C Screenin) How often did you have a drink containing alcohol in the past year? never  2) How many drinks did you have on a typical day when you were drinking in the past year? 0  3) How often did you have 6 or more drinks on one occasion in the past year? never    AUDIT-C Score: 0  Interpretation: Score 0-2 (female): Negative screen for alcohol misuse    Single Item Drug Screening:  How often have you used an illegal drug (including marijuana) or a prescription medication for non-medical reasons in the past year? never    Single Item Drug Screen Score: 0  Interpretation: Negative screen for possible drug use disorder    Social Drivers of Health     Financial Resource Strain: Low Risk  (2023)    Overall Financial Resource Strain (CARDIA)    • Difficulty of Paying Living Expenses: Not hard at all   Food Insecurity: No Food Insecurity (2025)    Nursing - Inadequate Food Risk Classification    • Worried About Running Out of Food in the Last Year: Never true    • Ran Out of Food in the Last Year: Never true    • Ran Out of Food in the Last Year: Never true   Transportation Needs: No Transportation Needs (2025)    PRAPARE - Transportation    • Lack of Transportation (Medical): No    • Lack of Transportation (Non-Medical): No   Housing Stability: Low Risk  (2025)    Housing Stability Vital Sign    • Unable to Pay for Housing in the Last Year: No    • Number of Times Moved in the Last Year: 0    • Homeless in the Last Year: No   Utilities: Not At Risk (2025)    [facility] Utilities    • Threatened with loss of utilities: No     No results found.    Objective   /66 (BP Location: Left arm, Patient Position: Sitting, Cuff Size: Standard)   Pulse 76   Temp 98.5 °F (36.9 °C)  (Temporal)   Ht 5' (1.524 m)   Wt 55.3 kg (122 lb)   SpO2 97%   BMI 23.83 kg/m²

## 2025-07-14 ENCOUNTER — APPOINTMENT (OUTPATIENT)
Dept: LAB | Age: 80
End: 2025-07-14
Payer: MEDICARE

## 2025-07-14 DIAGNOSIS — Z11.59 ENCOUNTER FOR HEPATITIS C SCREENING TEST FOR LOW RISK PATIENT: ICD-10-CM

## 2025-07-14 LAB — HCV AB SER QL: NORMAL

## 2025-07-14 PROCEDURE — 36415 COLL VENOUS BLD VENIPUNCTURE: CPT

## 2025-07-14 PROCEDURE — 86803 HEPATITIS C AB TEST: CPT

## 2025-07-23 ENCOUNTER — TELEPHONE (OUTPATIENT)
Dept: NEUROLOGY | Facility: CLINIC | Age: 80
End: 2025-07-23

## 2025-07-23 NOTE — TELEPHONE ENCOUNTER
Tried calling 3 times and the phone call kept dropping. Going to leave [employer] message stating appointment on 9/9 with Dr. Gunter will have to be rescheduled.

## 2025-07-24 ENCOUNTER — TELEPHONE (OUTPATIENT)
Age: 80
End: 2025-07-24

## 2025-07-24 NOTE — TELEPHONE ENCOUNTER
Pt called in earlier after receiving call from office that her September 9th appt would be cancelled due to provider being out. Attempted to reach pt several items over the phone but I am not able to leave any messages the phone rings continuously. I sent pt a message via [employer] that her appt had been rescheduled for August 22nd.Pt did mention during our initial phone call that she was okay with the appt being rescheduled and to just let her know new date and time.

## 2025-07-28 NOTE — TELEPHONE ENCOUNTER
Patient called to reschedule HFU, stated she can only travel to [facility].    HFU rescheduled 1/27/2025 at 10 AM with Dr. Gunter.      DISCHARGED 3/21/2025

## 2025-08-20 DIAGNOSIS — G47.09 OTHER INSOMNIA: ICD-10-CM

## 2025-08-20 DIAGNOSIS — F41.1 GAD (GENERALIZED ANXIETY DISORDER): ICD-10-CM

## 2025-08-20 RX ORDER — MIRTAZAPINE 7.5 MG/1
7.5 TABLET, FILM COATED ORAL
Qty: 90 TABLET | Refills: 1 | Status: SHIPPED | OUTPATIENT
Start: 2025-08-20